# Patient Record
Sex: FEMALE | Race: WHITE | NOT HISPANIC OR LATINO | Employment: FULL TIME | URBAN - METROPOLITAN AREA
[De-identification: names, ages, dates, MRNs, and addresses within clinical notes are randomized per-mention and may not be internally consistent; named-entity substitution may affect disease eponyms.]

---

## 2024-01-16 ENCOUNTER — ROUTINE PRENATAL (OUTPATIENT)
Dept: OBSTETRICS AND GYNECOLOGY | Facility: CLINIC | Age: 29
End: 2024-01-16
Payer: COMMERCIAL

## 2024-01-16 VITALS — DIASTOLIC BLOOD PRESSURE: 80 MMHG | WEIGHT: 200 LBS | BODY MASS INDEX: 35.43 KG/M2 | SYSTOLIC BLOOD PRESSURE: 122 MMHG

## 2024-01-16 DIAGNOSIS — Z34.93 PRENATAL CARE IN THIRD TRIMESTER: Primary | ICD-10-CM

## 2024-01-16 LAB
GLUCOSE UR STRIP-MCNC: NEGATIVE MG/DL
PROT UR STRIP-MCNC: ABNORMAL MG/DL

## 2024-01-16 RX ORDER — FERROUS SULFATE 325(65) MG
325 TABLET ORAL
COMMUNITY

## 2024-01-16 NOTE — PROGRESS NOTES
OB FOLLOW UP  CC- Here for care of pregnancy        Malena Stewart is a 29 y.o.  31w4d patient being seen today for her obstetrical follow up visit. Patient reports headache, pain in her left arm that will sometimes radiate to her shoulder, irregular contractions, heartburn and occasional nausea.      Patient is wanting a refill of protonix.     She reports she only takes her Labetalol some days because it will drop her BP too low. The highest it has been is 128/91. Lowest it has been is 105/78 and was symptomatic of dizziness.     Her prenatal care is complicated by (and status) :    Patient Active Problem List   Diagnosis    Ovarian ectopic pregnancy    Esophageal dysphagia    Encounter to determine fetal viability of pregnancy, fetus 1    Chronic hypertension during pregnancy, antepartum    Currently pregnant     (normal spontaneous vaginal delivery)       Flu Status: Declines  TDAP status: received at last visit  Rhogam status: was not indicated  28 week labs: Reviewed and Labs show anemia. She is taking additional iron supplement.  Ultrasound Today: No  Non Stress Test: No.      ROS -   Patient Reports :  see above  Patient Denies: Loss of Fluid, Vaginal Spotting, Vomiting , and Epigastric pain  Fetal Movement : normal  All other systems reviewed and are negative.       The additional following portions of the patient's history were reviewed and updated as appropriate: allergies and current medications.    I have reviewed and agree with the HPI, ROS, and historical information as entered above. Donte Brunner MD      /80   Wt 90.7 kg (200 lb)   LMP 2023   BMI 35.43 kg/m²         EXAM:     Prenatal Vitals  BP: 122/80  Weight: 90.7 kg (200 lb)                   Urine Glucose Read-only: Negative  Urine Protein Read-only: (!) Trace           Assessment and Plan    Problem List Items Addressed This Visit    None  Visit Diagnoses       Prenatal care in third trimester    -  Primary     Relevant Orders    POC Urinalysis Dipstick (Completed)            Pregnancy at 31w4d  Fetal status reassuring.  28 week labs reviewed.    Activity and Exercise discussed.  Fetal movement/PTL or Labor precautions  Patient is on Prenatal vitamins  Has stye and she will use compresses  Return in about 1 week (around 1/23/2024).    Donte Brunner MD  01/16/2024

## 2024-01-23 ENCOUNTER — ROUTINE PRENATAL (OUTPATIENT)
Dept: OBSTETRICS AND GYNECOLOGY | Facility: CLINIC | Age: 29
End: 2024-01-23
Payer: COMMERCIAL

## 2024-01-23 VITALS — DIASTOLIC BLOOD PRESSURE: 86 MMHG | BODY MASS INDEX: 35.34 KG/M2 | SYSTOLIC BLOOD PRESSURE: 124 MMHG | WEIGHT: 199.5 LBS

## 2024-01-23 DIAGNOSIS — O10.919 CHRONIC HYPERTENSION DURING PREGNANCY, ANTEPARTUM: ICD-10-CM

## 2024-01-23 DIAGNOSIS — Z34.90 PRENATAL CARE, ANTEPARTUM: Primary | ICD-10-CM

## 2024-01-23 LAB
GLUCOSE UR STRIP-MCNC: NEGATIVE MG/DL
PROT UR STRIP-MCNC: NEGATIVE MG/DL

## 2024-01-23 PROCEDURE — 99213 OFFICE O/P EST LOW 20 MIN: CPT | Performed by: NURSE PRACTITIONER

## 2024-01-23 NOTE — PROGRESS NOTES
OB FOLLOW UP  CC- Here for care of pregnancy        Malena Stewart is a 29 y.o.  32w4d patient being seen today for her obstetrical follow up visit. Patient reports fatigue, pelvic pressure/pain, lower back pain without dysuria, non-pitting edema in her ankles and holly andrade.     She reports she only takes labetalol once a day 50mg dose The highest it has been is 128/91. Lowest it has been is 105/78 and was symptomatic of dizziness and headaches.     Her prenatal care is complicated by (and status) :  Chronic HTN. Her average BP has been 110's/70's.  Patient Active Problem List   Diagnosis    Ovarian ectopic pregnancy    Esophageal dysphagia    Encounter to determine fetal viability of pregnancy, fetus 1    Chronic hypertension during pregnancy, antepartum    Currently pregnant     (normal spontaneous vaginal delivery)       Flu Status: Declines  TDAP status: received at last visit  Rhogam status: was not indicated  28 week labs: Reviewed and Labs show anemia. She is taking additional iron supplement.  Ultrasound Today: No  Non Stress Test: No.      ROS -   Patient Denies: Loss of Fluid, Vaginal Spotting, Vision Changes, Headaches, Nausea , Vomiting , and Epigastric pain  Fetal Movement : normal  All other systems reviewed and are negative.       The additional following portions of the patient's history were reviewed and updated as appropriate: allergies, current medications, past family history, past medical history, past social history, past surgical history, and problem list.    I have reviewed and agree with the HPI, ROS, and historical information as entered above. Latasha Minor, APRN      /86   Wt 90.5 kg (199 lb 8 oz)   LMP 2023   BMI 35.34 kg/m²         EXAM:     Prenatal Vitals  BP: 124/86  Weight: 90.5 kg (199 lb 8 oz)   Fetal Heart Rate: +      Fundal Height (cm): 34 cm        Urine Glucose Read-only: Negative  Urine Protein Read-only: Negative           Assessment  and Plan    Problem List Items Addressed This Visit          Gravid and     Chronic hypertension during pregnancy, antepartum    Relevant Medications    labetalol (NORMODYNE) 100 MG tablet    Other Relevant Orders    [facility] [place]     Other Visit Diagnoses       Prenatal care, antepartum    -  Primary    Relevant Orders    POC Urinalysis Dipstick (Completed)    [facility]            Pregnancy at 32w4d  Fetal status reassuring.  28 week labs reviewed.    Activity and Exercise discussed.  Fetal movement/PTL or Labor precautions  Patient is on Prenatal vitamins  Reviewed Pre-eclampsia signs/symptoms  Return in about 3 days (around 2024) for after PDC US.    Latasha Minor, APRN  2024

## 2024-01-26 ENCOUNTER — ROUTINE PRENATAL (OUTPATIENT)
Dept: OBSTETRICS AND GYNECOLOGY | Facility: CLINIC | Age: 29
End: 2024-01-26
Payer: COMMERCIAL

## 2024-01-26 VITALS — DIASTOLIC BLOOD PRESSURE: 68 MMHG | BODY MASS INDEX: 35.43 KG/M2 | SYSTOLIC BLOOD PRESSURE: 110 MMHG | WEIGHT: 200 LBS

## 2024-01-26 DIAGNOSIS — Z34.83 MULTIGRAVIDA IN THIRD TRIMESTER: Primary | ICD-10-CM

## 2024-01-26 DIAGNOSIS — Z3A.33 33 WEEKS GESTATION OF PREGNANCY: ICD-10-CM

## 2024-01-26 DIAGNOSIS — O10.919 CHRONIC HYPERTENSION DURING PREGNANCY, ANTEPARTUM: ICD-10-CM

## 2024-01-26 PROBLEM — Z34.90 PREGNANCY: Status: ACTIVE | Noted: 2024-01-26

## 2024-01-26 LAB
GLUCOSE UR STRIP-MCNC: NEGATIVE MG/DL
PROT UR STRIP-MCNC: ABNORMAL MG/DL

## 2024-01-26 NOTE — PROGRESS NOTES
OB FOLLOW UP  CC- Here for care of pregnancy        Malena Stewart is a 29 y.o.  33w0d patient being seen today for her obstetrical follow up visit. Patient reports having contractions on occasion, pelvic pressure, and HA(s) that are not severe and improve some with Tylenol. She reports history of migraines and HA(s) prior to pregnancy. Reports HA(s) seem to be caused by low BP. Denies vision changes, upper abd pain, constipation, SOA unrelated to preg, and dysuria. Admits to not drinking enough water recently.     Her prenatal care is complicated by (and status) :    Patient Active Problem List   Diagnosis    Ovarian ectopic pregnancy    Esophageal dysphagia    Encounter to determine fetal viability of pregnancy, fetus 1    Chronic hypertension during pregnancy, antepartum    Currently pregnant     (normal spontaneous vaginal delivery)    Pregnancy       Flu Status: Declines  TDAP status: received at last visit  Rhogam status: was not indicated  28 week labs: Reviewed  Ultrasound Today: No  Non Stress Test: Yes minutes > 20  non-stress test: NST: Reactive  indication:  CHTN on Labetalol      ROS -   Patient Denies: Loss of Fluid, Vaginal Spotting, Vision Changes, Headaches, Nausea , Vomiting , Epigastric pain, and skin itching  Fetal Movement : normal  All other systems reviewed and are negative.       The additional following portions of the patient's history were reviewed and updated as appropriate: allergies, current medications, past family history, past medical history, past social history, past surgical history, and problem list.    I have reviewed and agree with the HPI, ROS, and historical information as entered above. Ivette Rojo, APRN      /68   Wt 90.7 kg (200 lb)   LMP 2023   BMI 35.43 kg/m²         EXAM:     Prenatal Vitals  BP: 110/68  Weight: 90.7 kg (200 lb)   Fetal Heart Rate: NST   Dilation/Effacement/Station  Dilation:  (Cervix check offered. Declined.)        Urine Glucose Read-only: Negative  Urine Protein Read-only: (!) Trace       Assessment and Plan    Problem List Items Addressed This Visit       Chronic hypertension during pregnancy, antepartum    Overview     On Labetalol 50mg BID         Relevant Medications    labetalol (NORMODYNE) 100 MG tablet    Pregnancy    Overview     Tdap 12/28/23          Other Visit Diagnoses       Multigravida in third trimester    -  Primary    Relevant Orders    POC Urinalysis Dipstick (Completed)            Pregnancy at 33w0d  Fetal status reassuring.  28 week labs reviewed.    Activity and Exercise discussed.  Fetal movement/PTL or Labor precautions  Patient is on Prenatal vitamins  Discussed/encouraged Flu vaccination  Discussed/encouraged social distancing/COVID19 precautions; encouraged vaccination when able  Reviewed Pre-eclampsia signs/symptoms  Increase H20 intake to 80oz/day  Belly band and Tylenol sparingly PRN  Offered CCUA and cervix check. Declined.  PDC 2/5/24  Return in about 4 days (around 1/30/2024) for RACHEL worrell/Kannan NST.    Ivette Rojo, APRN  01/26/2024

## 2024-01-30 ENCOUNTER — HOSPITAL ENCOUNTER (OUTPATIENT)
Facility: HOSPITAL | Age: 29
Discharge: HOME | End: 2024-01-30
Attending: OBSTETRICS & GYNECOLOGY | Admitting: OBSTETRICS & GYNECOLOGY
Payer: COMMERCIAL

## 2024-01-30 ENCOUNTER — ROUTINE PRENATAL (OUTPATIENT)
Dept: OBSTETRICS AND GYNECOLOGY | Facility: CLINIC | Age: 29
End: 2024-01-30
Payer: COMMERCIAL

## 2024-01-30 VITALS
OXYGEN SATURATION: 98 % | DIASTOLIC BLOOD PRESSURE: 72 MMHG | HEART RATE: 85 BPM | WEIGHT: 198 LBS | RESPIRATION RATE: 16 BRPM | HEIGHT: 63 IN | BODY MASS INDEX: 35.08 KG/M2 | SYSTOLIC BLOOD PRESSURE: 132 MMHG | TEMPERATURE: 98.2 F

## 2024-01-30 VITALS — WEIGHT: 198 LBS | DIASTOLIC BLOOD PRESSURE: 94 MMHG | BODY MASS INDEX: 35.07 KG/M2 | SYSTOLIC BLOOD PRESSURE: 138 MMHG

## 2024-01-30 DIAGNOSIS — Z34.90 PRENATAL CARE, ANTEPARTUM: Primary | ICD-10-CM

## 2024-01-30 DIAGNOSIS — O10.919 CHRONIC HYPERTENSION DURING PREGNANCY, ANTEPARTUM: ICD-10-CM

## 2024-01-30 PROBLEM — O47.00 PRETERM CONTRACTIONS: Status: ACTIVE | Noted: 2024-01-30

## 2024-01-30 LAB
GLUCOSE UR STRIP-MCNC: NEGATIVE MG/DL
PROT UR STRIP-MCNC: NEGATIVE MG/DL

## 2024-01-30 PROCEDURE — 96372 THER/PROPH/DIAG INJ SC/IM: CPT

## 2024-01-30 PROCEDURE — 25010000002 TERBUTALINE PER 1 MG: Performed by: OBSTETRICS & GYNECOLOGY

## 2024-01-30 PROCEDURE — G0378 HOSPITAL OBSERVATION PER HR: HCPCS

## 2024-01-30 PROCEDURE — 59025 FETAL NON-STRESS TEST: CPT

## 2024-01-30 PROCEDURE — G0463 HOSPITAL OUTPT CLINIC VISIT: HCPCS

## 2024-01-30 PROCEDURE — 25810000003 LACTATED RINGERS SOLUTION: Performed by: OBSTETRICS & GYNECOLOGY

## 2024-01-30 RX ORDER — LIDOCAINE HYDROCHLORIDE 10 MG/ML
0.5 INJECTION, SOLUTION EPIDURAL; INFILTRATION; INTRACAUDAL; PERINEURAL ONCE AS NEEDED
Status: DISCONTINUED | OUTPATIENT
Start: 2024-01-30 | End: 2024-01-30 | Stop reason: HOSPADM

## 2024-01-30 RX ORDER — TERBUTALINE SULFATE 1 MG/ML
0.25 INJECTION, SOLUTION SUBCUTANEOUS ONCE AS NEEDED
Status: DISCONTINUED | OUTPATIENT
Start: 2024-01-30 | End: 2024-01-30 | Stop reason: HOSPADM

## 2024-01-30 RX ORDER — SODIUM CHLORIDE 9 MG/ML
40 INJECTION, SOLUTION INTRAVENOUS AS NEEDED
Status: DISCONTINUED | OUTPATIENT
Start: 2024-01-30 | End: 2024-01-30 | Stop reason: HOSPADM

## 2024-01-30 RX ORDER — SODIUM CHLORIDE 0.9 % (FLUSH) 0.9 %
10 SYRINGE (ML) INJECTION EVERY 12 HOURS SCHEDULED
Status: DISCONTINUED | OUTPATIENT
Start: 2024-01-30 | End: 2024-01-30 | Stop reason: HOSPADM

## 2024-01-30 RX ORDER — SODIUM CHLORIDE 0.9 % (FLUSH) 0.9 %
10 SYRINGE (ML) INJECTION AS NEEDED
Status: DISCONTINUED | OUTPATIENT
Start: 2024-01-30 | End: 2024-01-30 | Stop reason: HOSPADM

## 2024-01-30 RX ORDER — PANTOPRAZOLE SODIUM 20 MG/1
20 TABLET, DELAYED RELEASE ORAL DAILY
Qty: 30 TABLET | Refills: 2 | Status: SHIPPED | OUTPATIENT
Start: 2024-01-30

## 2024-01-30 RX ORDER — TERBUTALINE SULFATE 1 MG/ML
0.25 INJECTION, SOLUTION SUBCUTANEOUS ONCE
Status: COMPLETED | OUTPATIENT
Start: 2024-01-30 | End: 2024-01-30

## 2024-01-30 RX ADMIN — SODIUM CHLORIDE, POTASSIUM CHLORIDE, SODIUM LACTATE AND CALCIUM CHLORIDE 1000 ML: 600; 310; 30; 20 INJECTION, SOLUTION INTRAVENOUS at 13:38

## 2024-01-30 RX ADMIN — TERBUTALINE SULFATE 0.25 MG: 1 INJECTION SUBCUTANEOUS at 13:41

## 2024-01-30 NOTE — H&P
[facility]  Obstetric History and Physical    Chief Complaint   Patient presents with    Contractions       Subjective     Patient is a 29 y.o. female  currently at 33w4d, who presents with pressure and nausea.  Was noted to have contractions on NST and cervical exam 1 to 2 cm.    Her prenatal care is complicated by  hypertension  chronic hypertension.  Her previous obstetric/gynecological history is noted for is remarkable for term  x 3.    The following portions of the patients history were reviewed and updated as appropriate: current medications, allergies, past medical history, past surgical history, past family history, past social history, and problem list .       Prenatal Information:  Prenatal Results       Initial Prenatal Labs       Test Value Reference Range Date Time    Hemoglobin  11.8 g/dL 12.0 - 15.9 10/09/23 1549       11.9 g/dL 11.1 - 15.9 23 1508       14.3 g/dL 12.0 - 15.9 23 0702    Hematocrit  36.0 % 34.0 - 46.6 10/09/23 1549       36.2 % 34.0 - 46.6 23 1508       42.3 % 34.0 - 46.6 23 0702    Platelets  221 10*3/mm3 140 - 450 10/09/23 1549       282 x10E3/uL 150 - 450 23 1508       301 10*3/mm3 140 - 450 23 0702    Rubella IgG  2.08 index Immune >0.99 23 1508    Hepatitis B SAg  Negative  Negative 23 1508    Hepatitis C Ab  Non Reactive  Non Reactive 23 1508    RPR  Non Reactive  Non Reactive 23 1508    T. Pallidum Ab         ABO  A   23 1508    Rh  Positive   23 1508    Antibody Screen  Negative  Negative 23 1508    HIV  Non Reactive  Non Reactive 23 1508    Urine Culture  Final report   24 1045       Final report   24        Final report   23 1508    Gonorrhea  Negative  Negative 23 1508    Chlamydia  Negative  Negative 23 1508    TSH  1.840 uIU/mL 0.270 - 4.200 21 1447    HgB A1c         Varicella IgG        HgB Electrophoresis         Cystic fibrosis                    Fetal testing        Test Value Reference Range Date Time    NIPT        MSAFP        AFP-4                  2nd and 3rd Trimester       Test Value Reference Range Date Time    Hemoglobin (repeated)  10.9 g/dL 12.0 - 15.9 01/03/24 0949       10.9 g/dL 12.0 - 15.9 01/03/24 0949       11.0 g/dL 12.0 - 15.9 12/28/23 1241       11.9 g/dL 11.1 - 15.9 11/28/23 1529    Hematocrit (repeated)  32.9 % 34.0 - 46.6 01/03/24 0949       32.9 % 34.0 - 46.6 01/03/24 0949       34.4 % 34.0 - 46.6 12/28/23 1241       35.0 % 34.0 - 46.6 11/28/23 1529    Platelets   191 10*3/mm3 140 - 450 01/03/24 0949       191 10E3/mm3 140 - 450 01/03/24 0949       208 10*3/mm3 140 - 450 12/28/23 1241       227 x10E3/uL 150 - 450 11/28/23 1529       221 10*3/mm3 140 - 450 10/09/23 1549       282 x10E3/uL 150 - 450 07/28/23 1508       301 10*3/mm3 140 - 450 06/16/23 0702    GCT  108 mg/dL 65 - 139 12/28/23 1241    Antibody Screen (repeated)  Negative  Negative 12/28/23 1241    Third Trimester syphilis screen (repeated)         GTT Fasting        GTT 1 Hr        GTT 2 Hr        GTT 3 Hr        Group B Strep                  Other testing        Test Value Reference Range Date Time    Parvo IgG         CMV IgG                   Drug Screening       Test Value Reference Range Date Time    Amphetamine Screen  Negative ng/mL Dbrhjk=6875 07/28/23 1508    Barbiturate Screen  Negative ng/mL Pmtepp=382 07/28/23 1508    Benzodiazepine Screen  Negative ng/mL Zteqrj=821 07/28/23 1508    Methadone Screen  Negative ng/mL Wublcm=716 07/28/23 1508    Phencyclidine Screen  Negative ng/mL Cutoff=25 07/28/23 1508    Opiates Screen  Negative ng/mL Fyrquo=987 07/28/23 1508    THC Screen  Negative ng/mL Cutoff=20 07/28/23 1508    Cocaine Screen  Negative ng/mL Ubdlqi=335 07/28/23 1508    Propoxyphene Screen  Negative ng/mL Fbkuzb=251 07/28/23 1508    Buprenorphine Screen        Methamphetamine Screen        Oxycodone Screen        Tricyclic Antidepressants Screen                   Legend    ^: Historical                          External Prenatal Results       Pregnancy Outside Results - Transcribed From Office Records - See Scanned Records For Details       Test Value Date Time    ABO  A  07/28/23 1508    Rh  Positive  07/28/23 1508    Antibody Screen  Negative  12/28/23 1241       Negative  07/28/23 1508    Varicella IgG       Rubella  2.08 index 07/28/23 1508    Hgb  10.9 g/dL 01/03/24 0949       10.9 g/dL 01/03/24 0949       11.0 g/dL 12/28/23 1241       11.9 g/dL 11/28/23 1529       11.8 g/dL 10/09/23 1549       11.9 g/dL 07/28/23 1508       14.3 g/dL 06/16/23 0702    Hct  32.9 % 01/03/24 0949       32.9 % 01/03/24 0949       34.4 % 12/28/23 1241       35.0 % 11/28/23 1529       36.0 % 10/09/23 1549       36.2 % 07/28/23 1508       42.3 % 06/16/23 0702    Glucose Fasting GTT       Glucose Tolerance Test 1 hour       Glucose Tolerance Test 3 hour       Gonorrhea (discrete)  Negative  07/28/23 1508    Chlamydia (discrete)  Negative  07/28/23 1508    RPR  Non Reactive  07/28/23 1508    VDRL       Syphilis Antibody       HBsAg  Negative  07/28/23 1508    Herpes Simplex Virus PCR       Herpes Simplex VIrus Culture       HIV  Non Reactive  07/28/23 1508    Hep C RNA Quant PCR       Hep C Antibody  Non Reactive  07/28/23 1508    AFP       Group B Strep  Streptococcus agalactiae (Group B)  08/30/22 1739    GBS Susceptibility to Clindamycin       GBS Susceptibility to Erythromycin       Fetal Fibronectin       Genetic Testing, Maternal Blood                 Drug Screening       Test Value Date Time    Urine Drug Screen       Amphetamine Screen  Negative ng/mL 07/28/23 1508    Barbiturate Screen  Negative ng/mL 07/28/23 1508    Benzodiazepine Screen  Negative ng/mL 07/28/23 1508    Methadone Screen  Negative ng/mL 07/28/23 1508    Phencyclidine Screen  Negative ng/mL 07/28/23 1508    Opiates Screen       THC Screen       Cocaine Screen       Propoxyphene Screen  Negative ng/mL  23 1508    Buprenorphine Screen       Methamphetamine Screen       Oxycodone Screen       Tricyclic Antidepressants Screen                 Legend    ^: Historical                             Past OB History:     OB History    Para Term  AB Living   6 3 3 0 2 3   SAB IAB Ectopic Molar Multiple Live Births   1 0 1 0 0 3      # Outcome Date GA Lbr Adalberto/2nd Weight Sex Delivery Anes PTL Lv   6 Current            5 Term 22 38w0d / 00:21 3210 g (7 lb 1.2 oz) F Vag-Spont EPI N CHUCK      Name: CHANDLER SPARKS      Apgar1: 7  Apgar5: 9   4 SAB 2021           3 Ectopic 2021              Birth Comments: methotrexate   2 Term 13 38w0d  3175 g (7 lb) M Vag-Spont  N CHUCK   1 Term 11 38w0d  2920 g (6 lb 7 oz) F Vag-Spont  N CHUCK      Obstetric Comments   FOB#1-G1,2   FOB#2-G3,4,5       Past Medical History: Past Medical History:   Diagnosis Date    Acid reflux     Anxiety     Depression     Ectopic pregnancy 2021    Endometrial polyp     removed 3/2021    Gum disease         Herpes     Oral    Hypertension     Migraine     Recurrent pregnancy loss, antepartum condition or complication 2021    Varicella       Past Surgical History Past Surgical History:   Procedure Laterality Date    D & C HYSTEROSCOPY  2021    with polypectomy    DILATATION AND CURETTAGE  2021    ENDOSCOPY  2021    HYSTEROSCOPY  2021    TOOTH EXTRACTION  2023    WISDOM TOOTH EXTRACTION        Family History: Family History   Problem Relation Age of Onset    Hypertension Father     Diabetes Other     Hypertension Other     Breast cancer Neg Hx     Ovarian cancer Neg Hx     Uterine cancer Neg Hx     Colon cancer Neg Hx       Social History:  reports that she has never smoked. She has never been exposed to tobacco smoke. She has never used smokeless tobacco.   reports that she does not currently use alcohol.   reports no history of drug use.        Review of Systems:    All  other systems reviewed and negative    Objective     Vital Signs Range for the last 24 hours  Temperature: Temp:  [98.2 °F (36.8 °C)] 98.2 °F (36.8 °C)   Temp Source: Temp src: Oral   BP: BP: (124-138)/(72-94) 132/72   Pulse: Heart Rate:  [85] 85   Respirations: Resp:  [16] 16   SPO2: SpO2:  [98 %] 98 %   O2 Amount (l/min):     O2 Devices     Weight: Weight:  [89.8 kg (198 lb)] 89.8 kg (198 lb)     Physical Examination: General appearance - alert, well appearing, and in no distress  Neck - supple, no significant adenopathy  Abdomen - soft, nontender, nondistended, no masses or organomegaly  Pelvic - normal external genitalia, vulva, vagina, cervix, uterus and adnexa  Musculoskeletal - no joint tenderness, deformity or swelling  Extremities - peripheral pulses normal, no pedal edema, no clubbing or cyanosis  Skin - normal coloration and turgor, no rashes, no suspicious skin lesions noted    Presentation: vtx   Cervix: Exam by:  MD, unchanged   Dilation:  1+   Effacement:  50   Station:  -3     Fetal Heart Rate Assessment   NST reactive  Time >20min  Indication  contractions    Uterine Assessment   Occ ctx        Assessment & Plan        contractions    Chronic hypertension during pregnancy, antepartum      Assessment & Plan    Assessment:  1.  Intrauterine pregnancy at 33w4d weeks gestation with reassuring fetal status.    2.   contractions resolved with terbutaline and 1 L LR bolus, no cervical change    Plan:  1. D/c home.    2. Plan of care has been reviewed with patient  3.  Risks, benefits of treatment plan have been discussed.  4.  All questions have been answered.  5.  F/u as sched on Friday for NST  6.  PTL precautions      Pat Marmolejo MD  2024  14:43 EST

## 2024-01-30 NOTE — PROGRESS NOTES
OB FOLLOW UP  CC- Here for care of pregnancy        Malena Stewart is a 29 y.o.  33w4d patient being seen today for her obstetrical follow up visit. Patient reports headache  relieved by Tylenol or rest, visual changes , pelvic pressure not related to activity and nausea without vomiting and diarrhea nightly x 1 week    Her prenatal care is complicated by (and status) :   Patient Active Problem List   Diagnosis    Ovarian ectopic pregnancy    Esophageal dysphagia    Encounter to determine fetal viability of pregnancy, fetus 1    Chronic hypertension during pregnancy, antepartum    Currently pregnant     (normal spontaneous vaginal delivery)    Pregnancy       Flu Status: Declines  Ultrasound Today: No  Non Stress Test: Yes minutes 20+ minutes, reactive; indication: CHTN    ROS -   Patient Denies: Loss of Fluid, Vaginal Spotting, Vomiting , Contractions, Epigastric pain, and skin itching  Fetal Movement : normal  All other systems reviewed and are negative.       The additional following portions of the patient's history were reviewed and updated as appropriate: allergies, current medications, past medical history, past social history, past surgical history, and problem list.    I have reviewed and agree with the HPI, ROS, and historical information as entered above. Latasha Kennedyf, APRN      /94   Wt 89.8 kg (198 lb)   LMP 2023   BMI 35.07 kg/m²       EXAM:     Prenatal Vitals  BP: 138/94  Weight: 89.8 kg (198 lb)   Fetal Heart Rate: NST               Urine Glucose Read-only: Negative  Urine Protein Read-only: Negative           Assessment and Plan    Problem List Items Addressed This Visit          Gravid and     Chronic hypertension during pregnancy, antepartum    Overview     On Labetalol 50mg BID         Relevant Medications    labetalol (NORMODYNE) 100 MG tablet     Other Visit Diagnoses       Prenatal care, antepartum    -  Primary    Relevant Orders    POC  Urinalysis Dipstick (Completed)            Pregnancy at 33w4d  Fetal status reassuring.   Activity and Exercise discussed.  Fetal movement/PTL or Labor precautions  Patient is on Prenatal vitamins  Reviewed Pre-eclampsia signs/symptoms  Return in about 3 days (around 2/2/2024) for NST.    Latasha Minor, APRN  01/30/2024

## 2024-01-31 ENCOUNTER — TELEPHONE (OUTPATIENT)
Dept: OBSTETRICS AND GYNECOLOGY | Facility: CLINIC | Age: 29
End: 2024-01-31
Payer: COMMERCIAL

## 2024-02-01 ENCOUNTER — HOSPITAL ENCOUNTER (OUTPATIENT)
Facility: HOSPITAL | Age: 29
Discharge: HOME | End: 2024-02-01
Attending: OBSTETRICS & GYNECOLOGY | Admitting: OBSTETRICS & GYNECOLOGY
Payer: COMMERCIAL

## 2024-02-01 VITALS
SYSTOLIC BLOOD PRESSURE: 135 MMHG | RESPIRATION RATE: 16 BRPM | WEIGHT: 197.12 LBS | DIASTOLIC BLOOD PRESSURE: 75 MMHG | BODY MASS INDEX: 34.92 KG/M2 | TEMPERATURE: 98.2 F

## 2024-02-01 PROBLEM — Z34.93 THIRD TRIMESTER PREGNANCY: Status: ACTIVE | Noted: 2024-02-01

## 2024-02-01 PROCEDURE — 59025 FETAL NON-STRESS TEST: CPT

## 2024-02-01 PROCEDURE — G0463 HOSPITAL OUTPT CLINIC VISIT: HCPCS

## 2024-02-01 PROCEDURE — G0378 HOSPITAL OBSERVATION PER HR: HCPCS

## 2024-02-01 PROCEDURE — 99221 1ST HOSP IP/OBS SF/LOW 40: CPT | Performed by: OBSTETRICS & GYNECOLOGY

## 2024-02-01 PROCEDURE — 59025 FETAL NON-STRESS TEST: CPT | Performed by: OBSTETRICS & GYNECOLOGY

## 2024-02-01 NOTE — H&P
"[facility]  Obstetric History and Physical    Referring Provider: TOIBAS Gallardo      Chief Complaint   Patient presents with    Contractions       Subjective     Patient is a 29 y.o. female  currently at 33w6d, who presents with complaint of contractions.  Patient reports mild irregular contractions throughout the day without leaking fluid or vaginal bleeding.  Patient reports normal fetal activity.  Patient triaged in labor and delivery on  for  contractions received IV hydration and subcu terbutaline.  Prior exam -3.  After evaluation cervical exam unchanged.        The following portions of the patients history were reviewed and updated as appropriate: current medications, allergies, past medical history, past surgical history, past family history, past social history, and problem list .       Prenatal Information:   Maternal Prenatal Labs  Blood Type No results found for: \"ABO\"   Rh Status No results found for: \"RH\"   Antibody Screen No results found for: \"ABSCRN\"   Gonnorhea No results found for: \"GCCX\"   Chlamydia No results found for: \"CLAMYDCU\"   RPR No results found for: \"RPR\"   Syphilis Antibody No results found for: \"SYPHILIS\"   Rubella No results found for: \"RUBELLAIGGIN\"   Hepatitis B Surface Antigen No results found for: \"HEPBSAG\"   HIV-1 Antibody No results found for: \"LABHIV1\"   Hepatitis C Antibody No results found for: \"HEPCAB\"   Rapid Urin Drug Screen No results found for: \"AMPMETHU\", \"BARBITSCNUR\", \"LABBENZSCN\", \"LABMETHSCN\", \"LABOPIASCN\", \"THCURSCR\", \"COCAINEUR\", \"AMPHETSCREEN\", \"PROPOXSCN\", \"BUPRENORSCNU\", \"METAMPSCNUR\", \"OXYCODONESCN\", \"TRICYCLICSCN\"   Group B Strep Culture No results found for: \"GBSANTIGEN\"           External Prenatal Results       Pregnancy Outside Results - Transcribed From Office Records - See Scanned Records For Details       Test Value Date Time    ABO  A  23 1508    Rh  Positive  23 1508    Antibody Screen  Negative  " 23 1241       Negative  23 1508    Varicella IgG       Rubella  2.08 index 23 1508    Hgb  10.9 g/dL 24 0949       10.9 g/dL 24 0949       11.0 g/dL 23 1241       11.9 g/dL 23 1529       11.8 g/dL 10/09/23 1549       11.9 g/dL 23 1508       14.3 g/dL 23 0702    Hct  32.9 % 24 0949       32.9 % 24 0949       34.4 % 23 1241       35.0 % 23 1529       36.0 % 10/09/23 1549       36.2 % 23 1508       42.3 % 23 0702    Glucose Fasting GTT       Glucose Tolerance Test 1 hour       Glucose Tolerance Test 3 hour       Gonorrhea (discrete)  Negative  23 1508    Chlamydia (discrete)  Negative  23 1508    RPR  Non Reactive  23 1508    VDRL       Syphilis Antibody       HBsAg  Negative  23 1508    Herpes Simplex Virus PCR       Herpes Simplex VIrus Culture       HIV  Non Reactive  23 1508    Hep C RNA Quant PCR       Hep C Antibody  Non Reactive  23 1508    AFP       Group B Strep  Streptococcus agalactiae (Group B)  22 1739    GBS Susceptibility to Clindamycin       GBS Susceptibility to Erythromycin       Fetal Fibronectin       Genetic Testing, Maternal Blood                 Drug Screening       Test Value Date Time    Urine Drug Screen       Amphetamine Screen  Negative ng/mL 23 1508    Barbiturate Screen  Negative ng/mL 23 1508    Benzodiazepine Screen  Negative ng/mL 23 1508    Methadone Screen  Negative ng/mL 23 1508    Phencyclidine Screen  Negative ng/mL 23 1508    Opiates Screen       THC Screen       Cocaine Screen       Propoxyphene Screen  Negative ng/mL 23 1508    Buprenorphine Screen       Methamphetamine Screen       Oxycodone Screen       Tricyclic Antidepressants Screen                 Legend    ^: Historical                              Past OB History:       OB History    Para Term  AB Living   6 3 3 0 2 3   SAB IAB Ectopic Molar  Multiple Live Births   1 0 1 0 0 3      # Outcome Date GA Lbr Adalberto/2nd Weight Sex Delivery Anes PTL Lv   6 Current            5 Term 09/08/22 38w0d / 00:21 3210 g (7 lb 1.2 oz) F Vag-Spont EPI N CHUCK      Name: CHANDLER SPARKS      Apgar1: 7  Apgar5: 9   4 SAB 11/2021           3 Ectopic 07/2021              Birth Comments: methotrexate   2 Term 04/20/13 38w0d  3175 g (7 lb) M Vag-Spont  N CHUCK   1 Term 11/01/11 38w0d  2920 g (6 lb 7 oz) F Vag-Spont  N CHUCK      Obstetric Comments   FOB#1-G1,2   FOB#2-G3,4,5       Past Medical History: Past Medical History:   Diagnosis Date    Acid reflux     Anxiety     Depression     Ectopic pregnancy July 2021    Endometrial polyp     removed 3/2021    Gum disease     2023    Herpes 2018    Oral    Hypertension     Migraine     Recurrent pregnancy loss, antepartum condition or complication November 7, 2021    Varicella       Past Surgical History Past Surgical History:   Procedure Laterality Date    D & C HYSTEROSCOPY  03/26/2021    with polypectomy    DILATATION AND CURETTAGE  03/26/2021    ENDOSCOPY  05/2021    HYSTEROSCOPY  03/26/2021    TOOTH EXTRACTION  07/28/2023    WISDOM TOOTH EXTRACTION        Family History: Family History   Problem Relation Age of Onset    Hypertension Father     Diabetes Other     Hypertension Other     Breast cancer Neg Hx     Ovarian cancer Neg Hx     Uterine cancer Neg Hx     Colon cancer Neg Hx       Social History:  reports that she has never smoked. She has never been exposed to tobacco smoke. She has never used smokeless tobacco.   reports that she does not currently use alcohol.   reports no history of drug use.                   General ROS Negative Findings:Headaches, Visual Changes, Epigastric pain, Anorexia, Nausia/Vomiting, ROM, and Vaginal Bleeding    ROS     All other systems have been reviewed and are neg  Objective       Vital Signs Range for the last 24 hours  Temperature: Temp:  [98.2 °F (36.8 °C)] 98.2 °F (36.8 °C)   Temp Source:  Temp src: Oral   BP: BP: (135)/(75) 135/75   Pulse:     Respirations: Resp:  [16] 16   SPO2:     O2 Amount (l/min):     O2 Devices     Weight: Weight:  [89.4 kg (197 lb 1.9 oz)] 89.4 kg (197 lb 1.9 oz)     Physical Examination:   General:   alert, appears stated age, and cooperative   Skin:   normal   HEENT: Sclera clear   Lungs:      Heart:      Gastrointestinal: Abdomen soft, gravid uterus, nontender, no guarding, rebound benign exam   Lower Extremities: Trace edema, no calf tenderness   : External genitalia: normal general appearance  Uterus: enlarged   Musculoskeletal:     Neuro: No focal deficits noted.         Presentation: vtx   Cervix: Exam by: Method: sterile exam per physician   Dilation:  1+   Effacement: Cervical Effacement: 50-60%   Station:  -3    No Blood noted on glove.       Fetal Heart Rate Assessment   Method:     Beats/min:     Baseline:     Varibility:     Accels:     Decels:     Tracing Category:     NST-indications contractions, interpretation reactive, moderate variability, accelerations present 15 x 15, no fetal deceleration noted, onset 1055, end time 1545, rare contraction noted.  Uterine Assessment   Method:     Frequency (min):     Ctx Count in 10 min:     Duration:     Intensity:     Intensity by IUPC:     Resting Tone:     Resting Tone by IUPC:     [place] Units:       Laboratory Results:   Lab Results (last 24 hours)       ** No results found for the last 24 hours. **          Radiology Review:   Imaging Results (Last 24 Hours)       ** No results found for the last 24 hours. **          Other Studies:    Assessment & Plan       Third trimester pregnancy        Assessment:  1.  Intrauterine pregnancy at 33w6d weeks gestation with reactive fetal status.    2.  False labor  3.  History of 3 full-term vaginal deliveries  4.      Plan:  1.  Discharge to home, kick count,  labor instructions given, follow-up with OB provider tomorrow for schedule appointment or as needed  2.  Plan of care has been reviewed with patient.  3.  Risks, benefits of treatment plan have been discussed.  4.  All questions have been answered.  5      Oren Solares,   2/1/2024  15:39 EST

## 2024-02-02 ENCOUNTER — ROUTINE PRENATAL (OUTPATIENT)
Dept: OBSTETRICS AND GYNECOLOGY | Facility: CLINIC | Age: 29
End: 2024-02-02
Payer: COMMERCIAL

## 2024-02-02 VITALS — DIASTOLIC BLOOD PRESSURE: 84 MMHG | WEIGHT: 200.2 LBS | BODY MASS INDEX: 35.46 KG/M2 | SYSTOLIC BLOOD PRESSURE: 134 MMHG

## 2024-02-02 DIAGNOSIS — Z34.90 PREGNANCY, UNSPECIFIED GESTATIONAL AGE: ICD-10-CM

## 2024-02-02 DIAGNOSIS — O10.919 CHRONIC HYPERTENSION DURING PREGNANCY, ANTEPARTUM: ICD-10-CM

## 2024-02-02 DIAGNOSIS — R13.19 ESOPHAGEAL DYSPHAGIA: ICD-10-CM

## 2024-02-02 DIAGNOSIS — O99.019 MATERNAL ANEMIA IN PREGNANCY, ANTEPARTUM: ICD-10-CM

## 2024-02-02 DIAGNOSIS — Z34.83 MULTIGRAVIDA IN THIRD TRIMESTER: Primary | ICD-10-CM

## 2024-02-02 LAB
GLUCOSE UR STRIP-MCNC: NEGATIVE MG/DL
PROT UR STRIP-MCNC: NEGATIVE MG/DL

## 2024-02-02 NOTE — PROGRESS NOTES
OB FOLLOW UP  CC- Here for care of pregnancy        Malena Stewart is a 29 y.o.  34w0d patient being seen today for her obstetrical follow up visit. Patient reports was seen in L&D last night for contractions.     Her prenatal care is complicated by (and status) :   Patient Active Problem List   Diagnosis    Ovarian ectopic pregnancy    Esophageal dysphagia    Encounter to determine fetal viability of pregnancy, fetus 1    Chronic hypertension during pregnancy, antepartum    Currently pregnant    Pregnancy     contractions    Third trimester pregnancy    Maternal anemia in pregnancy, antepartum       Flu Status: Declines  Ultrasound Today: No  Non Stress Test: Yes minutes 20   indication: Hypertension    ROS -   Patient Denies: leaking, nausea, vomiting, or bleeding  Fetal Movement : normal  All other systems reviewed and are negative.       The additional following portions of the patient's history were reviewed and updated as appropriate: allergies, current medications, past family history, past medical history, past social history, past surgical history, and problem list.    I have reviewed and agree with the HPI, ROS, and historical information as entered above. Lora Loza, APRN      /84   Wt 90.8 kg (200 lb 3.2 oz)   LMP 2023   BMI 35.46 kg/m²       EXAM:     Prenatal Vitals  BP: 134/84  Weight: 90.8 kg (200 lb 3.2 oz)   Fetal Heart Rate: NST               Urine Glucose Read-only: Negative  Urine Protein Read-only: Negative           Assessment and Plan    Problem List Items Addressed This Visit          Gastrointestinal Abdominal     Esophageal dysphagia    Overview     Protonix daily         Relevant Medications    pantoprazole (PROTONIX) 20 MG EC tablet       Gravid and     Chronic hypertension during pregnancy, antepartum    Overview     On Labetalol 50mg daily only r/t hypotension    Baseline PEP ALT 48  Baby aspirin 81mg daily  NST 2xweek  PDC 24          Relevant Medications    labetalol (NORMODYNE) 100 MG tablet    Other Relevant Orders    Fetal Nonstress Test    Pregnancy    Overview     Tdap 12/28/23            Hematology and Neoplasia    Maternal anemia in pregnancy, antepartum    Overview     Ferrous sulfate 325mg daily         Relevant Medications    ferrous sulfate 325 (65 FE) MG tablet     Other Visit Diagnoses       Multigravida in third trimester    -  Primary    Relevant Orders    POC Urinalysis Dipstick (Completed)            Pregnancy at 34w0d  Fetal status reassuring. 20 minute NST reactive 15 x 15  Activity and Exercise discussed.  Fetal movement/PTL or Labor precautions  Patient is on Prenatal vitamins  Reviewed Pre-eclampsia signs/symptoms-denies  Discussed bASA for PIH prevention from 12 to 36wk  Follow up after PDC ultrasound Monday     Lora Loza, APRN  02/02/2024

## 2024-02-05 ENCOUNTER — HOSPITAL ENCOUNTER (OUTPATIENT)
Dept: WOMENS IMAGING | Facility: HOSPITAL | Age: 29
Discharge: HOME | End: 2024-02-05
Admitting: NURSE PRACTITIONER
Payer: COMMERCIAL

## 2024-02-05 ENCOUNTER — ROUTINE PRENATAL (OUTPATIENT)
Dept: OBSTETRICS AND GYNECOLOGY | Facility: CLINIC | Age: 29
End: 2024-02-05
Payer: COMMERCIAL

## 2024-02-05 ENCOUNTER — OFFICE VISIT (OUTPATIENT)
Dept: OBSTETRICS AND GYNECOLOGY | Facility: HOSPITAL | Age: 29
End: 2024-02-05
Payer: COMMERCIAL

## 2024-02-05 VITALS — BODY MASS INDEX: 35.43 KG/M2 | WEIGHT: 200 LBS | DIASTOLIC BLOOD PRESSURE: 75 MMHG | SYSTOLIC BLOOD PRESSURE: 135 MMHG

## 2024-02-05 VITALS — BODY MASS INDEX: 35.25 KG/M2 | WEIGHT: 199 LBS | SYSTOLIC BLOOD PRESSURE: 128 MMHG | DIASTOLIC BLOOD PRESSURE: 92 MMHG

## 2024-02-05 DIAGNOSIS — Z34.90 PREGNANCY, UNSPECIFIED GESTATIONAL AGE: Primary | ICD-10-CM

## 2024-02-05 DIAGNOSIS — O10.919 CHRONIC HYPERTENSION DURING PREGNANCY, ANTEPARTUM: ICD-10-CM

## 2024-02-05 DIAGNOSIS — O10.919 CHRONIC HYPERTENSION DURING PREGNANCY, ANTEPARTUM: Primary | ICD-10-CM

## 2024-02-05 DIAGNOSIS — Z34.90 PRENATAL CARE, ANTEPARTUM: ICD-10-CM

## 2024-02-05 DIAGNOSIS — R13.19 ESOPHAGEAL DYSPHAGIA: ICD-10-CM

## 2024-02-05 DIAGNOSIS — O47.00 PRETERM CONTRACTIONS: ICD-10-CM

## 2024-02-05 LAB
GLUCOSE UR STRIP-MCNC: NEGATIVE MG/DL
PROT UR STRIP-MCNC: NEGATIVE MG/DL

## 2024-02-05 PROCEDURE — 99213 OFFICE O/P EST LOW 20 MIN: CPT

## 2024-02-05 PROCEDURE — 99213 OFFICE O/P EST LOW 20 MIN: CPT | Performed by: OBSTETRICS & GYNECOLOGY

## 2024-02-05 PROCEDURE — 76819 FETAL BIOPHYS PROFIL W/O NST: CPT | Performed by: OBSTETRICS & GYNECOLOGY

## 2024-02-05 PROCEDURE — 3078F DIAST BP <80 MM HG: CPT | Performed by: OBSTETRICS & GYNECOLOGY

## 2024-02-05 PROCEDURE — 76811 OB US DETAILED SNGL FETUS: CPT | Performed by: OBSTETRICS & GYNECOLOGY

## 2024-02-05 PROCEDURE — 76811 OB US DETAILED SNGL FETUS: CPT

## 2024-02-05 PROCEDURE — 76819 FETAL BIOPHYS PROFIL W/O NST: CPT

## 2024-02-05 PROCEDURE — 3075F SYST BP GE 130 - 139MM HG: CPT | Performed by: OBSTETRICS & GYNECOLOGY

## 2024-02-05 NOTE — PROGRESS NOTES
OB FOLLOW UP  CC- Here for care of pregnancy        Malena Stewart is a 29 y.o.  34w3d patient being seen today for her obstetrical follow up visit. Patient reports her BP's at home have been 115-120/70s. Acid reflux resolved with Protonix. Reports bx hx irregular contractions.     HA(s) that are mild and have been continuous through out pregnancy. No vision changes, upper abd pain, facial swelling.     Her prenatal care is complicated by (and status) :   Patient Active Problem List   Diagnosis    Ovarian ectopic pregnancy    Esophageal dysphagia    Encounter to determine fetal viability of pregnancy, fetus 1    Chronic hypertension during pregnancy, antepartum    Currently pregnant    Pregnancy     contractions    Third trimester pregnancy    Maternal anemia in pregnancy, antepartum       Flu Status: Declines  Ultrasound Today: Yes at PDC.   Non Stress Test: No    ROS - Headaches and irregular and BH contractions  Patient Denies: Loss of Fluid, Vaginal Spotting, Vision Changes, Nausea , Vomiting , Epigastric pain, and skin itching  Fetal Movement : normal  All other systems reviewed and are negative.     The additional following portions of the patient's history were reviewed and updated as appropriate: allergies and current medications.    I have reviewed and agree with the HPI, ROS, and historical information as entered above. Ivette Rojo, APRN      /92   Wt 90.3 kg (199 lb)   LMP 2023   BMI 35.25 kg/m²       EXAM:     Prenatal Vitals  BP: 128/92  Weight: 90.3 kg (199 lb)   Fetal Heart Rate: 145           Urine Glucose Read-only: Negative  Urine Protein Read-only: Negative       Assessment and Plan    Problem List Items Addressed This Visit       Chronic hypertension during pregnancy, antepartum    Overview     On Labetalol 50mg daily only r/t hypotension    Baseline PEP ALT 48  Baby aspirin 81mg daily  NST 2xweek  PDC 24         Relevant Medications    labetalol  (NORMODYNE) 100 MG tablet    Esophageal dysphagia    Overview     Protonix daily         Relevant Medications    pantoprazole (PROTONIX) 20 MG EC tablet    Pregnancy - Primary    Overview     Tdap 23  Declined flu vaccination         Relevant Orders    POC Urinalysis Dipstick (Completed)     contractions    Overview     24-   contractions resolved with terbutaline and 1 L LR bolus, no cervical change             Pregnancy at 34w3d  Fetal status reassuring.   PDC report reviewed.   Activity and Exercise discussed.  Discussed diastolic BP and potential need to increase Labetalol if stays elevated. She will continue to monitor and call for questions or concerns.   Fetal movement/PTL or Labor precautions  Patient is on Prenatal vitamins  Discussed/encouraged social distancing/COVID19 precautions; encouraged vaccination when able.  Reviewed Pre-eclampsia signs/symptoms.  Return in about 3 days (around 2024) for Kannan Dunn NST.    Ivette Rojo, APRN  2024

## 2024-02-05 NOTE — LETTER
"2024       No Recipients    Patient: Malena Stewart   YOB: 1995   Date of Visit: 2024       Dear TOBIAS Jacome,    Thank you for referring Malena Stewart to me for evaluation. Below is a copy of my consult note.    If you have questions, please do not hesitate to call me. I look forward to following Malena along with you.         Sincerely,        Rome Parra MD        CC:   No Recipients    Pt has appt with Dr. Yu's office today. NIPT negative. Pt states she was seen in triage last week for contractions and received 1 liter of IVF and a shot of terbutaline. Pt was 1cm/50-60% effaced. Pt states contractions are now intermittent. She denies vaginal bleeding or loss of fluid.Pt has history of an SAB and ectopic pregnancy treated with methotrexate.        Maternal/Fetal Medicine Consult Note   Date: 2024  Name: Malena Stewart    : 1995     MRN: [id number]     Referring Provider: TOBIAS Jacome    Chief Complaint  CHTN    Subjective     History of Present Illness:  Malena Stewart is a 29 y.o.  34w3d who presents today for consultation secondary to chronic hypertension    DARYL: Estimated Date of Delivery: 3/15/24     ROS:   Otherwise Noted in HPI    Past Medical History:   Diagnosis Date   • Acid reflux    • Anxiety    • Depression    • Ectopic pregnancy 2021    treated with methotrexate   • Endometrial polyp     severe bleeding and polyps-removed and \"cleaned up\" 3/2021   • Gum disease        • Herpes 2018    Oral   • History of mastitis 2013    Rt breast, required I&D, antibiotics, and packing   • Hypertension 2020    Initially started on lisinopril 20 mg   • Migraine     hasnt seen an MD for migraine   • Recurrent pregnancy loss, antepartum condition or complication 2021    Ectopic 2021, SAB 2021   • Varicella       Past Surgical History:   Procedure Laterality Date   • D & C HYSTEROSCOPY  2021 " "   with polypectomy   • DILATATION AND CURETTAGE  2021   • ENDOSCOPY  2021    GERD   • HYSTEROSCOPY  2021   • TOOTH EXTRACTION  2023   • WISDOM TOOTH EXTRACTION        OB History          6    Para   3    Term   3       0    AB   2    Living   3         SAB   1    IAB   0    Ectopic   1    Molar   0    Multiple   0    Live Births   3          Obstetric Comments   FOB#1-G1,2  FOB#2-G3,4,5,6    G4: pregnancy complicated by CHTN treated with labetalol and placental shelf                     Current Outpatient Medications:   •  ferrous sulfate 325 (65 FE) MG tablet, Take 1 tablet by mouth Daily With Breakfast., Disp: , Rfl:   •  labetalol (NORMODYNE) 100 MG tablet, Take 0.5 tablets by mouth 2 (Two) Times a Day. (Patient taking differently: Take 0.5 tablets by mouth Daily.), Disp: 30 tablet, Rfl: 2  •  pantoprazole (PROTONIX) 20 MG EC tablet, Take 1 tablet by mouth Daily., Disp: 30 tablet, Rfl: 2  •  prenatal vitamin (prenatal, CLASSIC, vitamin) tablet, Take  by mouth Daily., Disp: , Rfl:     Objective     Vital Signs  /75   Wt 90.7 kg (200 lb)   LMP 2023   Estimated body mass index is 35.43 kg/m² as calculated from the following:    Height as of 24: 160 cm (63\").    Weight as of this encounter: 90.7 kg (200 lb).    Ultrasound Impression:   See Viewpoint     Assessment and Plan     Malena Stewart is a 29 y.o.  34w3d who presents today for  consultation secondary to chronic hypertension    Diagnoses and all orders for this visit:    1. Chronic hypertension during pregnancy, antepartum (Primary)  Assessment & Plan:  Patient currently on 50 mg of labetalol once daily.  Baseline preeclampsia evaluation showed slight elevation in ALT at 48.  Patient currently receiving twice weekly nonstress test.  Patient is on aspirin prophylaxis for preeclampsia.  Overall fetal growth at the 52nd percentile and abdominal circumference at the 51st percentile today.  " Anatomy overall appears normal.  Follow-up ultrasounds at your discretion           Follow Up  Follow-up as clinically indicated    I spent 10 minutes caring for the patient on the day of service. This included: obtaining or reviewing a separately obtained medical history, reviewing patient records, performing a medically appropriate exam and/or evaluation, counseling or educating the patient/family/caregiver, ordering medications, labs, and/or procedures and documenting such in the medical record. This does not include time spent on review and interpretation of other tests such as fetal ultrasound or the performance of other procedures such as amniocentesis or CVS.      Rome Parra MD, FACOG  Maternal Fetal Medicine, [facility] [place]

## 2024-02-05 NOTE — ASSESSMENT & PLAN NOTE
Patient currently on 50 mg of labetalol once daily.  Baseline preeclampsia evaluation showed slight elevation in ALT at 48.  Patient currently receiving twice weekly nonstress test.  Patient is on aspirin prophylaxis for preeclampsia.  Overall fetal growth at the 52nd percentile and abdominal circumference at the 51st percentile today.  Anatomy overall appears normal.  Follow-up ultrasounds at your discretion

## 2024-02-05 NOTE — PROGRESS NOTES
"    Maternal/Fetal Medicine Consult Note   Date: 2024  Name: Malena Stewart    : 1995     MRN: [id number]     Referring Provider: TOBIAS Jacome    Chief Complaint  CHTN    Subjective     History of Present Illness:  Malena Stewart is a 29 y.o.  34w3d who presents today for consultation secondary to chronic hypertension    DARYL: Estimated Date of Delivery: 3/15/24     ROS:   Otherwise Noted in HPI    Past Medical History:   Diagnosis Date    Acid reflux     Anxiety     Depression     Ectopic pregnancy 2021    treated with methotrexate    Endometrial polyp     severe bleeding and polyps-removed and \"cleaned up\" 3/2021    Gum disease         Herpes     Oral    History of mastitis     Rt breast, required I&D, antibiotics, and packing    Hypertension     Initially started on lisinopril 20 mg    Migraine     hasnt seen an MD for migraine    Recurrent pregnancy loss, antepartum condition or complication 2021    Ectopic 2021, SAB 2021    Varicella 1997      Past Surgical History:   Procedure Laterality Date    D & C HYSTEROSCOPY  2021    with polypectomy    DILATATION AND CURETTAGE  2021    ENDOSCOPY  2021    GERD    HYSTEROSCOPY  2021    TOOTH EXTRACTION  2023    WISDOM TOOTH EXTRACTION        OB History          6    Para   3    Term   3       0    AB   2    Living   3         SAB   1    IAB   0    Ectopic   1    Molar   0    Multiple   0    Live Births   3          Obstetric Comments   FOB#1-G1,2  FOB#2-G3,4,5,6    G4: pregnancy complicated by CHTN treated with labetalol and placental shelf                     Current Outpatient Medications:     ferrous sulfate 325 (65 FE) MG tablet, Take 1 tablet by mouth Daily With Breakfast., Disp: , Rfl:     labetalol (NORMODYNE) 100 MG tablet, Take 0.5 tablets by mouth 2 (Two) Times a Day. (Patient taking differently: Take 0.5 tablets by mouth Daily.), " "Disp: 30 tablet, Rfl: 2    pantoprazole (PROTONIX) 20 MG EC tablet, Take 1 tablet by mouth Daily., Disp: 30 tablet, Rfl: 2    prenatal vitamin (prenatal, CLASSIC, vitamin) tablet, Take  by mouth Daily., Disp: , Rfl:     Objective     Vital Signs  /75   Wt 90.7 kg (200 lb)   LMP 2023   Estimated body mass index is 35.43 kg/m² as calculated from the following:    Height as of 24: 160 cm (63\").    Weight as of this encounter: 90.7 kg (200 lb).    Ultrasound Impression:   See Viewpoint     Assessment and Plan     Malena Stewart is a 29 y.o.  34w3d who presents today for  consultation secondary to chronic hypertension    Diagnoses and all orders for this visit:    1. Chronic hypertension during pregnancy, antepartum (Primary)  Assessment & Plan:  Patient currently on 50 mg of labetalol once daily.  Baseline preeclampsia evaluation showed slight elevation in ALT at 48.  Patient currently receiving twice weekly nonstress test.  Patient is on aspirin prophylaxis for preeclampsia.  Overall fetal growth at the 52nd percentile and abdominal circumference at the 51st percentile today.  Anatomy overall appears normal.  Follow-up ultrasounds at your discretion           Follow Up  Follow-up as clinically indicated    I spent 10 minutes caring for the patient on the day of service. This included: obtaining or reviewing a separately obtained medical history, reviewing patient records, performing a medically appropriate exam and/or evaluation, counseling or educating the patient/family/caregiver, ordering medications, labs, and/or procedures and documenting such in the medical record. This does not include time spent on review and interpretation of other tests such as fetal ultrasound or the performance of other procedures such as amniocentesis or CVS.      Rome Parra MD, FACOG  Maternal Fetal Medicine, [facility]    [facility]   "

## 2024-02-05 NOTE — PROGRESS NOTES
Pt has appt with Dr. Yu's office today. NIPT negative. Pt states she was seen in triage last week for contractions and received 1 liter of IVF and a shot of terbutaline. Pt was 1cm/50-60% effaced. Pt states contractions are now intermittent. She denies vaginal bleeding or loss of fluid.Pt has history of an SAB and ectopic pregnancy treated with methotrexate.

## 2024-02-08 ENCOUNTER — ROUTINE PRENATAL (OUTPATIENT)
Dept: OBSTETRICS AND GYNECOLOGY | Facility: CLINIC | Age: 29
End: 2024-02-08
Payer: COMMERCIAL

## 2024-02-08 VITALS — SYSTOLIC BLOOD PRESSURE: 126 MMHG | WEIGHT: 199 LBS | DIASTOLIC BLOOD PRESSURE: 82 MMHG | BODY MASS INDEX: 35.25 KG/M2

## 2024-02-08 DIAGNOSIS — O10.913 PRE-EXISTING HYPERTENSION COMPLICATING PREGNANCY IN THIRD TRIMESTER: Primary | ICD-10-CM

## 2024-02-08 DIAGNOSIS — Z34.90 PRENATAL CARE, ANTEPARTUM: ICD-10-CM

## 2024-02-08 LAB
GLUCOSE UR STRIP-MCNC: NEGATIVE MG/DL
PROT UR STRIP-MCNC: NEGATIVE MG/DL

## 2024-02-08 NOTE — PROGRESS NOTES
OB FOLLOW UP  CC- Here for care of pregnancy        Malena Stewart is a 29 y.o.  34w6d patient being seen today for her obstetrical follow up visit. Patient reports pelvic pressure and cramping, frequent soft BM's x 1 week. Denies diarrhea or vomiting.     Her prenatal care is complicated by (and status) :   Patient Active Problem List   Diagnosis    Ovarian ectopic pregnancy    Esophageal dysphagia    Encounter to determine fetal viability of pregnancy, fetus 1    Chronic hypertension during pregnancy, antepartum    Currently pregnant    Pregnancy     contractions    Third trimester pregnancy    Maternal anemia in pregnancy, antepartum       Flu Status: Declines  Ultrasound Today: No  Non Stress Test: Yes minutes 30+  non-stress test: NST: Reactive  indication: Hypertension  category: Category I    ROS -   Patient Denies: Loss of Fluid, Vaginal Spotting, Vision Changes, Headaches, Nausea , Vomiting , Contractions, Epigastric pain, and skin itching  Fetal Movement : normal  All other systems reviewed and are negative.       The additional following portions of the patient's history were reviewed and updated as appropriate: allergies, current medications, past medical history, past surgical history, and problem list.    I have reviewed and agree with the HPI, ROS, and historical information as entered above. Marisela Hansen, APRN      /82   Wt 90.3 kg (199 lb)   LMP 2023   BMI 35.25 kg/m²       EXAM:     Prenatal Vitals  BP: 126/82  Weight: 90.3 kg (199 lb)   Fetal Heart Rate: NST   Dilation/Effacement/Station  Dilation: 1  Effacement (%): 50  Station: -3           Urine Glucose Read-only: Negative  Urine Protein Read-only: Negative           Assessment and Plan    Problem List Items Addressed This Visit    None  Visit Diagnoses       Pre-existing hypertension complicating pregnancy in third trimester    -  Primary    Relevant Orders    POC Urinalysis Dipstick (Completed)     Prenatal care, antepartum        Relevant Orders    POC Urinalysis Dipstick (Completed)            Pregnancy at 34w6d  Fetal status reassuring.   Activity and Exercise discussed.  Fetal movement/PTL or Labor precautions  Reviewed Pre-eclampsia signs/symptoms  Return for NST twice weekly.    Marisela Hansen, APRN  02/08/2024

## 2024-02-12 ENCOUNTER — ROUTINE PRENATAL (OUTPATIENT)
Dept: OBSTETRICS AND GYNECOLOGY | Facility: CLINIC | Age: 29
End: 2024-02-12
Payer: COMMERCIAL

## 2024-02-12 VITALS — BODY MASS INDEX: 36.03 KG/M2 | WEIGHT: 203.4 LBS | DIASTOLIC BLOOD PRESSURE: 82 MMHG | SYSTOLIC BLOOD PRESSURE: 128 MMHG

## 2024-02-12 DIAGNOSIS — O10.919 CHRONIC HYPERTENSION DURING PREGNANCY, ANTEPARTUM: ICD-10-CM

## 2024-02-12 DIAGNOSIS — N89.8 VAGINAL DISCHARGE: ICD-10-CM

## 2024-02-12 DIAGNOSIS — Z34.93 PRENATAL CARE IN THIRD TRIMESTER: Primary | ICD-10-CM

## 2024-02-12 DIAGNOSIS — O99.019 MATERNAL ANEMIA IN PREGNANCY, ANTEPARTUM: ICD-10-CM

## 2024-02-12 DIAGNOSIS — Z34.93 THIRD TRIMESTER PREGNANCY: ICD-10-CM

## 2024-02-12 LAB
GLUCOSE UR STRIP-MCNC: NEGATIVE MG/DL
PROT UR STRIP-MCNC: NEGATIVE MG/DL

## 2024-02-12 PROCEDURE — 99213 OFFICE O/P EST LOW 20 MIN: CPT | Performed by: NURSE PRACTITIONER

## 2024-02-12 PROCEDURE — 59025 FETAL NON-STRESS TEST: CPT | Performed by: NURSE PRACTITIONER

## 2024-02-13 ENCOUNTER — ROUTINE PRENATAL (OUTPATIENT)
Dept: OBSTETRICS AND GYNECOLOGY | Facility: CLINIC | Age: 29
End: 2024-02-13
Payer: COMMERCIAL

## 2024-02-13 ENCOUNTER — TELEPHONE (OUTPATIENT)
Dept: OBSTETRICS AND GYNECOLOGY | Facility: CLINIC | Age: 29
End: 2024-02-13
Payer: COMMERCIAL

## 2024-02-13 VITALS — DIASTOLIC BLOOD PRESSURE: 80 MMHG | WEIGHT: 201.6 LBS | SYSTOLIC BLOOD PRESSURE: 128 MMHG | BODY MASS INDEX: 35.71 KG/M2

## 2024-02-13 DIAGNOSIS — Z34.93 PRENATAL CARE IN THIRD TRIMESTER: ICD-10-CM

## 2024-02-13 DIAGNOSIS — O10.919 CHRONIC HYPERTENSION DURING PREGNANCY, ANTEPARTUM: ICD-10-CM

## 2024-02-13 DIAGNOSIS — Z3A.35 35 WEEKS GESTATION OF PREGNANCY: Primary | ICD-10-CM

## 2024-02-13 DIAGNOSIS — R10.9 ABDOMINAL PAIN DURING PREGNANCY IN THIRD TRIMESTER: ICD-10-CM

## 2024-02-13 DIAGNOSIS — O26.893 ABDOMINAL PAIN DURING PREGNANCY IN THIRD TRIMESTER: ICD-10-CM

## 2024-02-13 DIAGNOSIS — O47.00 PRETERM CONTRACTIONS: ICD-10-CM

## 2024-02-13 DIAGNOSIS — O99.019 MATERNAL ANEMIA IN PREGNANCY, ANTEPARTUM: ICD-10-CM

## 2024-02-13 DIAGNOSIS — O10.919 CHRONIC HYPERTENSION AFFECTING PREGNANCY: ICD-10-CM

## 2024-02-13 LAB
A VAGINAE DNA VAG QL NAA+PROBE: NORMAL SCORE
BILIRUB BLD-MCNC: NEGATIVE MG/DL
BVAB2 DNA VAG QL NAA+PROBE: NORMAL SCORE
C ALBICANS DNA VAG QL NAA+PROBE: NEGATIVE
C GLABRATA DNA VAG QL NAA+PROBE: NEGATIVE
CLARITY, POC: CLEAR
COLOR UR: YELLOW
GLUCOSE UR STRIP-MCNC: NEGATIVE MG/DL
GLUCOSE UR STRIP-MCNC: NEGATIVE MG/DL
KETONES UR QL: NEGATIVE
LEUKOCYTE EST, POC: NEGATIVE
MEGA1 DNA VAG QL NAA+PROBE: NORMAL SCORE
NITRITE UR-MCNC: NEGATIVE MG/ML
PH UR: 6 [PH] (ref 5–8)
PROT UR STRIP-MCNC: NEGATIVE MG/DL
PROT UR STRIP-MCNC: NEGATIVE MG/DL
RBC # UR STRIP: NEGATIVE /UL
SP GR UR: 1.01 (ref 1–1.03)
UROBILINOGEN UR QL: NORMAL

## 2024-02-15 ENCOUNTER — ROUTINE PRENATAL (OUTPATIENT)
Dept: OBSTETRICS AND GYNECOLOGY | Facility: CLINIC | Age: 29
End: 2024-02-15
Payer: COMMERCIAL

## 2024-02-15 ENCOUNTER — LAB (OUTPATIENT)
Dept: LAB | Facility: HOSPITAL | Age: 29
End: 2024-02-15
Payer: COMMERCIAL

## 2024-02-15 VITALS — DIASTOLIC BLOOD PRESSURE: 78 MMHG | WEIGHT: 204 LBS | BODY MASS INDEX: 36.14 KG/M2 | SYSTOLIC BLOOD PRESSURE: 128 MMHG

## 2024-02-15 DIAGNOSIS — Z34.90 PRENATAL CARE, ANTEPARTUM: Primary | ICD-10-CM

## 2024-02-15 DIAGNOSIS — O10.919 CHRONIC HYPERTENSION DURING PREGNANCY, ANTEPARTUM: ICD-10-CM

## 2024-02-15 DIAGNOSIS — O99.019 MATERNAL ANEMIA IN PREGNANCY, ANTEPARTUM: ICD-10-CM

## 2024-02-15 DIAGNOSIS — Z3A.35 35 WEEKS GESTATION OF PREGNANCY: ICD-10-CM

## 2024-02-15 DIAGNOSIS — Z34.93 THIRD TRIMESTER PREGNANCY: ICD-10-CM

## 2024-02-15 LAB
BACTERIA UR CULT: NORMAL
BACTERIA UR CULT: NORMAL
GLUCOSE UR STRIP-MCNC: NEGATIVE MG/DL
PROT UR STRIP-MCNC: NEGATIVE MG/DL

## 2024-02-15 PROCEDURE — 87081 CULTURE SCREEN ONLY: CPT

## 2024-02-18 LAB — BACTERIA SPEC AEROBE CULT: NORMAL

## 2024-02-19 ENCOUNTER — ROUTINE PRENATAL (OUTPATIENT)
Dept: OBSTETRICS AND GYNECOLOGY | Facility: CLINIC | Age: 29
End: 2024-02-19
Payer: COMMERCIAL

## 2024-02-19 VITALS — SYSTOLIC BLOOD PRESSURE: 118 MMHG | DIASTOLIC BLOOD PRESSURE: 80 MMHG | BODY MASS INDEX: 36.14 KG/M2 | WEIGHT: 204 LBS

## 2024-02-19 DIAGNOSIS — O99.019 MATERNAL ANEMIA IN PREGNANCY, ANTEPARTUM: ICD-10-CM

## 2024-02-19 DIAGNOSIS — O10.919 CHRONIC HYPERTENSION DURING PREGNANCY, ANTEPARTUM: ICD-10-CM

## 2024-02-19 DIAGNOSIS — Z3A.36 36 WEEKS GESTATION OF PREGNANCY: Primary | ICD-10-CM

## 2024-02-19 DIAGNOSIS — R13.19 ESOPHAGEAL DYSPHAGIA: ICD-10-CM

## 2024-02-19 DIAGNOSIS — O47.00 PRETERM CONTRACTIONS: ICD-10-CM

## 2024-02-19 LAB
GLUCOSE UR STRIP-MCNC: NEGATIVE MG/DL
PROT UR STRIP-MCNC: NEGATIVE MG/DL

## 2024-02-19 PROCEDURE — 59025 FETAL NON-STRESS TEST: CPT

## 2024-02-19 PROCEDURE — 99213 OFFICE O/P EST LOW 20 MIN: CPT

## 2024-02-19 NOTE — PROGRESS NOTES
OB FOLLOW UP  CC- Here for care of pregnancy        Malena Stewart is a 29 y.o.  36w3d patient being seen today for her obstetrical follow up visit. Patient reports GI issues that started last week. Patient reports Intermittent nausea, diarrhea, and constipation. Saturday night got super sick. Symptoms only present at night.  Mild heartburn while taking Protonix 20mg. Patient also reports lower abdominal and back cramps as well as gas pain.  She reports a history of irritable bowel syndrome symptoms prior to pregnancy.  Saw GI MD but symptoms improved.     Her prenatal care is complicated by (and status) :  Patient Active Problem List   Diagnosis    Ovarian ectopic pregnancy    Esophageal dysphagia    Encounter to determine fetal viability of pregnancy, fetus 1    Chronic hypertension during pregnancy, antepartum    Currently pregnant    Pregnancy     contractions    Third trimester pregnancy    Maternal anemia in pregnancy, antepartum       GBS Status: was already done and is negative.    Allergies   Allergen Reactions    Amoxicillin Hives     Other reaction(s): Hives  Other reaction(s): Hives            Flu Status: Declines  Her Delivery Plan is:  wants to wait for baby to come on their own     US today: no  Reason for test:  Chronic hypertension on labetalol 100 mg daily  Date of Test: 2024  Time frame of test: 20 min.  RN NST Interpretation:  Reactive with 1 contraction      ROS -   Patient Denies: Loss of Fluid, Vaginal Spotting, Vision Changes, Headaches, Vomiting , Contractions, Epigastric pain, and skin itching  Fetal Movement : normal  All other systems reviewed and are negative.       The additional following portions of the patient's history were reviewed and updated as appropriate: allergies and current medications.    I have reviewed and agree with the HPI, ROS, and historical information as entered above. Ivette Rojo, APRN      EXAM:     Prenatal Vitals  BP:  118/80  Weight: 92.5 kg (204 lb)   Fetal Heart Rate: NST       Dilation/Effacement/Station  Dilation: 1  Effacement (%): 60  Station: -3    Urine Glucose Read-only: Negative  Urine Protein Read-only: Negative       Assessment and Plan    Problem List Items Addressed This Visit       Chronic hypertension during pregnancy, antepartum    Overview     On Labetalol 50mg daily only r/t hypotension    Baseline PEP ALT 48  Baby aspirin 81mg daily  NST 2xweek  PDC 24  Increased to 100mg QD.          Relevant Medications    labetalol (NORMODYNE) 100 MG tablet    Esophageal dysphagia    Overview     Protonix daily         Relevant Medications    pantoprazole (PROTONIX) 20 MG EC tablet    Maternal anemia in pregnancy, antepartum    Overview     Ferrous sulfate 325mg daily         Relevant Medications    ferrous sulfate 325 (65 FE) MG tablet    Pregnancy - Primary    Overview     Tdap 23  Declined flu vaccination         Relevant Orders    POC Urinalysis Dipstick (Completed)     contractions    Overview     24-   contractions resolved with terbutaline and 1 L LR bolus, no cervical change             Pregnancy at 36w3d  Fetal status reassuring.   Offered to increase heartburn medicine to 40 mg or switch to another heartburn medication.  Patient declined.  She states she will take Tums in addition as needed.  Zofran offered for nausea.  Patient declined.  Encouraged to eat frequent small meals Q1-2h, bland or dry foods, high protein meals or snacks, avoid spicy and greasy foods.  We discussed IBS diet to improve GI related symptoms.  Discussed/encouraged social distancing/COVID19 precautions; encouraged vaccination when able  Reviewed Pre-eclampsia signs/symptoms  Discussed options for IOL. Patient desires spontaneous labor. Would like to postpone an IOL unless medically indicated.   Delivery options reviewed with patient  Signs of labor reviewed  Kick counts reviewed  Activity and Exercise  discussed.  IBS education included in patient instructions.   Return in about 3 days (around 2/22/2024) for Kannan Dunn.    Ivette Rojo, TOBIAS  02/19/2024

## 2024-02-21 ENCOUNTER — TELEPHONE (OUTPATIENT)
Dept: OBSTETRICS AND GYNECOLOGY | Facility: CLINIC | Age: 29
End: 2024-02-21
Payer: COMMERCIAL

## 2024-02-21 NOTE — TELEPHONE ENCOUNTER
Pt calling because she has been having intermittent contractions since 12PM on 02/20/24. Pt reports she has low back pain/burning, her lower abdomen is tight, and she has intense pelvic pain. She is unsure if she should go to Labor and Delivery.

## 2024-02-21 NOTE — TELEPHONE ENCOUNTER
PT CALLED STATING SHE WOULD LIKE TO CANCEL HER APPT FOR TODAY SHE WANTS TO JUST KEEP HER APPT FOR TOMORROW PLEASE CALL     UNABLE TO WT CALL

## 2024-02-21 NOTE — TELEPHONE ENCOUNTER
Returned patient's call. States she does not want to come in today; she will wait until her scheduled appointment tomorrow and will go to L&D if symptoms worsen tonight. She denies any bleeding, leaking fluid, or regular contractions and reports goo fetal movement. She will call if those things change.

## 2024-02-21 NOTE — TELEPHONE ENCOUNTER
Returned patient's call. Patient of Dr. Brunner;  @ 36w 5d. States yesterday around noon she started having lower abdominal tightening and pelvic pain with intermittent lower back burning pain. States it got worse through the night and continues with same intensity now. Reports she feels horrible; nauseous with a H/A. Having irregular contractions. Has some increase in vaginal discharge. Having body aches and chills intermittently for past 2-3 weeks. States she has lots of GI issues.   Denies any fever; Temp 97.8 now. Denies any bleeding or leaking fluid. Denies s/s of UTI.  Reports normal fetal movement. Next prenatal visit is scheduled for tomorrow.   Offered appointment today. She v/u and agreed. Appointment scheduled.

## 2024-02-22 ENCOUNTER — ROUTINE PRENATAL (OUTPATIENT)
Dept: OBSTETRICS AND GYNECOLOGY | Facility: CLINIC | Age: 29
End: 2024-02-22
Payer: COMMERCIAL

## 2024-02-22 VITALS — SYSTOLIC BLOOD PRESSURE: 118 MMHG | BODY MASS INDEX: 36.28 KG/M2 | DIASTOLIC BLOOD PRESSURE: 84 MMHG | WEIGHT: 204.8 LBS

## 2024-02-22 DIAGNOSIS — Z34.90 PRENATAL CARE, ANTEPARTUM: ICD-10-CM

## 2024-02-22 DIAGNOSIS — Z3A.36 36 WEEKS GESTATION OF PREGNANCY: Primary | ICD-10-CM

## 2024-02-22 LAB
GLUCOSE UR STRIP-MCNC: NEGATIVE MG/DL
PROT UR STRIP-MCNC: NEGATIVE MG/DL

## 2024-02-22 NOTE — PROGRESS NOTES
OB FOLLOW UP  CC- Here for care of pregnancy        Malena Stewart is a 29 y.o.  36w6d patient being seen today for her obstetrical follow up visit. Patient reports her BP's at home have been 120s/80s with the Labetolol increase of 100mg daily. She reports on Saturday night she started having increased burning sensation in lower back that went away on . Then Tuesday the pain started again.   She reports pelvic pressure and tightening when going from sitting to standing. She has to stop and pace herself because the pain is so bad. She reports irregular BH contractions with the back and pelvic pain, but they go away on their own. She denies any urinary symptoms.     Her prenatal care is complicated by (and status) :  Patient Active Problem List   Diagnosis    Ovarian ectopic pregnancy    Esophageal dysphagia    Encounter to determine fetal viability of pregnancy, fetus 1    Chronic hypertension during pregnancy, antepartum    Currently pregnant    Pregnancy     contractions    Third trimester pregnancy    Maternal anemia in pregnancy, antepartum       GBS Status: was already done and is negative.    Allergies   Allergen Reactions    Amoxicillin Hives     Other reaction(s): Hives  Other reaction(s): Hives            Flu Status: Declines  Her Delivery Plan is: Undecided    US today: no  Non Stress Test: Yes minutes 20  non-stress test: NST: Reactive  indication: Hypertension  category: Category I  attestation:  .me    ROS -   Patient Denies: Loss of Fluid, Vaginal Spotting, Vision Changes, Headaches, Nausea , Vomiting , Epigastric pain, and skin itching  Fetal Movement : normal  All other systems reviewed and are negative.       The additional following portions of the patient's history were reviewed and updated as appropriate: allergies, current medications, past family history, past medical history, past social history, past surgical history, and problem list.    I have reviewed and agree  with the HPI, ROS, and historical information as entered above. Donte Brunner MD        EXAM:     Prenatal Vitals  BP: 118/84  Weight: 92.9 kg (204 lb 12.8 oz)   Fetal Heart Rate: NST              Urine Glucose Read-only: Negative  Urine Protein Read-only: Negative           Assessment and Plan    Problem List Items Addressed This Visit          Gravid and     Pregnancy - Primary    Overview     Tdap 23  Declined flu vaccination          Other Visit Diagnoses       Prenatal care, antepartum        Relevant Orders    POC Urinalysis Dipstick (Completed)            Pregnancy at 36w6d  Fetal status reassuring.   Reviewed Pre-eclampsia signs/symptoms  Delivery options reviewed with patient  Signs of labor reviewed  Kick counts reviewed  Activity and Exercise discussed.  Return in about 5 days (around 2024) for nst.    Donte Brunner MD  2024

## 2024-02-27 ENCOUNTER — ROUTINE PRENATAL (OUTPATIENT)
Dept: OBSTETRICS AND GYNECOLOGY | Facility: CLINIC | Age: 29
End: 2024-02-27
Payer: COMMERCIAL

## 2024-02-27 VITALS — DIASTOLIC BLOOD PRESSURE: 84 MMHG | BODY MASS INDEX: 36.24 KG/M2 | WEIGHT: 204.6 LBS | SYSTOLIC BLOOD PRESSURE: 122 MMHG

## 2024-02-27 DIAGNOSIS — Z34.93 PRENATAL CARE IN THIRD TRIMESTER: Primary | ICD-10-CM

## 2024-02-27 LAB
GLUCOSE UR STRIP-MCNC: NEGATIVE MG/DL
PROT UR STRIP-MCNC: NEGATIVE MG/DL

## 2024-02-27 NOTE — PROGRESS NOTES
OB FOLLOW UP  CC- Here for care of pregnancy        Malena Stewart is a 29 y.o.  37w4d patient being seen today for her obstetrical follow up visit. Patient reports lower abdominal cramping and nausea. She reports her hernia has gotten bigger and this morning she had severe/sharp abdominal pain for a few hours with a black tarry BM.     Her blood pressures at home have been 120s/80s.      Her prenatal care is complicated by (and status) :   Patient Active Problem List   Diagnosis    Ovarian ectopic pregnancy    Esophageal dysphagia    Encounter to determine fetal viability of pregnancy, fetus 1    Chronic hypertension during pregnancy, antepartum    Currently pregnant    Pregnancy     contractions    Third trimester pregnancy    Maternal anemia in pregnancy, antepartum       GBS Status:   Group B Strep Culture   Date Value Ref Range Status   02/15/2024 No Group B Streptococcus Isolated at 2 days  Final         Allergies   Allergen Reactions    Amoxicillin Hives     Other reaction(s): Hives  Other reaction(s): Hives          Her Delivery Plan is:  IOL 3/8/24     US today: no  Non Stress Test: Yes minutes 20  non-stress test: NST: Reactive  indication: Hypertension  category: Category I  attestation:     ROS -   Patient Denies: Loss of Fluid, Vaginal Spotting, Vision Changes, Headaches, Vomiting , Epigastric pain, and skin itching  Fetal Movement : normal  All other systems reviewed and are negative.       The additional following portions of the patient's history were reviewed and updated as appropriate: allergies and current medications.    I have reviewed and agree with the HPI, ROS, and historical information as entered above. Donte Brunner MD        EXAM: Cx 2/70/-2 membranes stripped     Prenatal Vitals  BP: 122/84  Weight: 92.8 kg (204 lb 9.6 oz)                  Urine Glucose Read-only: Negative  Urine Protein Read-only: Negative           Assessment and Plan    Problem List Items  Addressed This Visit    None  Visit Diagnoses       Prenatal care in third trimester    -  Primary    Relevant Orders    POC Urinalysis Dipstick (Completed)    Fetal Nonstress Test            Pregnancy at 37w4d  Fetal status reassuring.   Reviewed Pre-eclampsia signs/symptoms  BP ok and we will see her in 3 days for NST   Delivery options reviewed with patient  Signs of labor reviewed  Kick counts reviewed  Activity and Exercise discussed.  Return in about 3 days (around 3/1/2024). NST then     Donte Brunner MD  02/27/2024

## 2024-03-01 ENCOUNTER — PREP FOR SURGERY (OUTPATIENT)
Dept: OTHER | Facility: HOSPITAL | Age: 29
End: 2024-03-01
Payer: COMMERCIAL

## 2024-03-01 ENCOUNTER — ROUTINE PRENATAL (OUTPATIENT)
Dept: OBSTETRICS AND GYNECOLOGY | Facility: CLINIC | Age: 29
End: 2024-03-01
Payer: COMMERCIAL

## 2024-03-01 VITALS — DIASTOLIC BLOOD PRESSURE: 84 MMHG | WEIGHT: 205.8 LBS | BODY MASS INDEX: 36.46 KG/M2 | SYSTOLIC BLOOD PRESSURE: 128 MMHG

## 2024-03-01 DIAGNOSIS — O10.919 CHRONIC HYPERTENSION AFFECTING PREGNANCY: ICD-10-CM

## 2024-03-01 DIAGNOSIS — Z34.93 PRENATAL CARE IN THIRD TRIMESTER: Primary | ICD-10-CM

## 2024-03-01 DIAGNOSIS — Z34.83 PRENATAL CARE, SUBSEQUENT PREGNANCY, THIRD TRIMESTER: Primary | ICD-10-CM

## 2024-03-01 LAB
GLUCOSE UR STRIP-MCNC: NEGATIVE MG/DL
PROT UR STRIP-MCNC: ABNORMAL MG/DL

## 2024-03-01 RX ORDER — MORPHINE SULFATE 2 MG/ML
2 INJECTION, SOLUTION INTRAMUSCULAR; INTRAVENOUS
Status: CANCELLED | OUTPATIENT
Start: 2024-03-01 | End: 2024-03-11

## 2024-03-01 RX ORDER — MISOPROSTOL 200 UG/1
800 TABLET ORAL AS NEEDED
Status: CANCELLED | OUTPATIENT
Start: 2024-03-01

## 2024-03-01 RX ORDER — OXYTOCIN/0.9 % SODIUM CHLORIDE 30/500 ML
999 PLASTIC BAG, INJECTION (ML) INTRAVENOUS ONCE
Status: CANCELLED | OUTPATIENT
Start: 2024-03-01 | End: 2024-03-01

## 2024-03-01 RX ORDER — PROMETHAZINE HYDROCHLORIDE 12.5 MG/1
12.5 SUPPOSITORY RECTAL EVERY 6 HOURS PRN
Status: CANCELLED | OUTPATIENT
Start: 2024-03-01

## 2024-03-01 RX ORDER — LIDOCAINE HYDROCHLORIDE 10 MG/ML
0.5 INJECTION, SOLUTION EPIDURAL; INFILTRATION; INTRACAUDAL; PERINEURAL ONCE AS NEEDED
Status: CANCELLED | OUTPATIENT
Start: 2024-03-01

## 2024-03-01 RX ORDER — SODIUM CHLORIDE 0.9 % (FLUSH) 0.9 %
10 SYRINGE (ML) INJECTION AS NEEDED
Status: CANCELLED | OUTPATIENT
Start: 2024-03-01

## 2024-03-01 RX ORDER — CARBOPROST TROMETHAMINE 250 UG/ML
250 INJECTION, SOLUTION INTRAMUSCULAR AS NEEDED
Status: CANCELLED | OUTPATIENT
Start: 2024-03-01

## 2024-03-01 RX ORDER — MAGNESIUM CARB/ALUMINUM HYDROX 105-160MG
30 TABLET,CHEWABLE ORAL ONCE
Status: CANCELLED | OUTPATIENT
Start: 2024-03-01 | End: 2024-03-01

## 2024-03-01 RX ORDER — SODIUM CHLORIDE 0.9 % (FLUSH) 0.9 %
10 SYRINGE (ML) INJECTION EVERY 12 HOURS SCHEDULED
Status: CANCELLED | OUTPATIENT
Start: 2024-03-01

## 2024-03-01 RX ORDER — METHYLERGONOVINE MALEATE 0.2 MG/ML
200 INJECTION INTRAVENOUS ONCE AS NEEDED
Status: CANCELLED | OUTPATIENT
Start: 2024-03-01

## 2024-03-01 RX ORDER — PROMETHAZINE HYDROCHLORIDE 12.5 MG/1
12.5 TABLET ORAL EVERY 6 HOURS PRN
Status: CANCELLED | OUTPATIENT
Start: 2024-03-01

## 2024-03-01 RX ORDER — OXYTOCIN/0.9 % SODIUM CHLORIDE 30/500 ML
2-30 PLASTIC BAG, INJECTION (ML) INTRAVENOUS
Status: CANCELLED | OUTPATIENT
Start: 2024-03-08

## 2024-03-01 RX ORDER — SODIUM CHLORIDE 9 MG/ML
40 INJECTION, SOLUTION INTRAVENOUS AS NEEDED
Status: CANCELLED | OUTPATIENT
Start: 2024-03-01

## 2024-03-01 RX ORDER — ACETAMINOPHEN 325 MG/1
650 TABLET ORAL EVERY 4 HOURS PRN
Status: CANCELLED | OUTPATIENT
Start: 2024-03-01

## 2024-03-01 RX ORDER — OXYCODONE AND ACETAMINOPHEN 7.5; 325 MG/1; MG/1
2 TABLET ORAL EVERY 4 HOURS PRN
Status: CANCELLED | OUTPATIENT
Start: 2024-03-01 | End: 2024-03-11

## 2024-03-01 RX ORDER — SODIUM CHLORIDE, SODIUM LACTATE, POTASSIUM CHLORIDE, CALCIUM CHLORIDE 600; 310; 30; 20 MG/100ML; MG/100ML; MG/100ML; MG/100ML
125 INJECTION, SOLUTION INTRAVENOUS CONTINUOUS
Status: CANCELLED | OUTPATIENT
Start: 2024-03-01

## 2024-03-01 RX ORDER — OXYTOCIN/0.9 % SODIUM CHLORIDE 30/500 ML
250 PLASTIC BAG, INJECTION (ML) INTRAVENOUS CONTINUOUS
Status: CANCELLED | OUTPATIENT
Start: 2024-03-01 | End: 2024-03-01

## 2024-03-01 NOTE — PROGRESS NOTES
OB FOLLOW UP  CC- Here for care of pregnancy        Malena Stewart is a 29 y.o.  38w0d patient being seen today for her obstetrical follow up visit. Patient reports that she lost her mucus plug yesterday and has had a lot of mucus discharge since then. She reports that since she lost her mucus plug yesterday, she has had irregular contractions that occur every 8-10 minutes that last around one hour each time they happen. Home BP has been ranging 120's/80's. She reports some nausea.     Her prenatal care is complicated by (and status) :   Patient Active Problem List   Diagnosis    Ovarian ectopic pregnancy    Esophageal dysphagia    Encounter to determine fetal viability of pregnancy, fetus 1    Chronic hypertension during pregnancy, antepartum    Currently pregnant    Pregnancy     contractions    Third trimester pregnancy    Maternal anemia in pregnancy, antepartum       GBS Status: negative  Group B Strep Culture   Date Value Ref Range Status   02/15/2024 No Group B Streptococcus Isolated at 2 days  Final         Allergies   Allergen Reactions    Amoxicillin Hives     Other reaction(s): Hives  Other reaction(s): Hives            Flu Status: Declines  Her Delivery Plan is:  IOL of labor 3-8-24 @ 0600     US today: no  Non Stress Test: Yes minutes 20+  non-stress test: NST: Reactive  indication: Hypertension  category: Category I  attestation:  .kezia Brunner MD      ROS -   Patient Denies: Loss of Fluid, Vaginal Spotting, Vision Changes, Headaches, Nausea , Epigastric pain, and skin itching  Fetal Movement : normal  All other systems reviewed and are negative.       The additional following portions of the patient's history were reviewed and updated as appropriate: allergies, current medications, past family history, past medical history, past social history, past surgical history, and problem list.    I have reviewed and agree with the HPI, ROS, and historical information as entered  above. Donte Brunner MD        EXAM:     Prenatal Vitals  BP: 128/84  Weight: 93.4 kg (205 lb 12.8 oz)   Fetal Heart Rate: 155              Urine Glucose Read-only: Negative  Urine Protein Read-only: (!) Trace           Assessment and Plan    Problem List Items Addressed This Visit    None  Visit Diagnoses       Prenatal care in third trimester    -  Primary    Relevant Orders    POC Urinalysis Dipstick (Completed)    Chronic hypertension affecting pregnancy                Pregnancy at 38w0d  Fetal status reassuring.   Reviewed Pre-eclampsia signs/symptoms  Reviewed upcoming IOL with patient. Instructions given.  Delivery options reviewed with patient  Signs of labor reviewed  Kick counts reviewed  Activity and Exercise discussed.  Return for IOL next thursday .    Donte Brunner MD  03/01/2024

## 2024-03-07 ENCOUNTER — HOSPITAL ENCOUNTER (INPATIENT)
Facility: HOSPITAL | Age: 29
LOS: 2 days | Discharge: HOME | End: 2024-03-10
Attending: OBSTETRICS & GYNECOLOGY | Admitting: OBSTETRICS & GYNECOLOGY
Payer: COMMERCIAL

## 2024-03-07 ENCOUNTER — HOSPITAL ENCOUNTER (OUTPATIENT)
Dept: LABOR AND DELIVERY | Facility: HOSPITAL | Age: 29
Discharge: HOME | End: 2024-03-07
Payer: COMMERCIAL

## 2024-03-07 DIAGNOSIS — Z34.83 PRENATAL CARE, SUBSEQUENT PREGNANCY, THIRD TRIMESTER: ICD-10-CM

## 2024-03-07 LAB
ALP SERPL-CCNC: 107 U/L (ref 39–117)
ALT SERPL W P-5'-P-CCNC: 12 U/L (ref 1–33)
AST SERPL-CCNC: 18 U/L (ref 1–32)
BILIRUB SERPL-MCNC: 0.3 MG/DL (ref 0–1.2)
CREAT SERPL-MCNC: 0.53 MG/DL (ref 0.57–1)
DEPRECATED RDW RBC AUTO: 44.7 FL (ref 37–54)
ERYTHROCYTE [DISTWIDTH] IN BLOOD BY AUTOMATED COUNT: 14.1 % (ref 12.3–15.4)
HCT VFR BLD AUTO: 35 % (ref 34–46.6)
HGB BLD-MCNC: 11.5 G/DL (ref 12–15.9)
LDH SERPL-CCNC: 242 U/L (ref 135–214)
MCH RBC QN AUTO: 28.4 PG (ref 26.6–33)
MCHC RBC AUTO-ENTMCNC: 32.9 G/DL (ref 31.5–35.7)
MCV RBC AUTO: 86.4 FL (ref 79–97)
PLATELET # BLD AUTO: 176 10*3/MM3 (ref 140–450)
PMV BLD AUTO: 12.4 FL (ref 6–12)
RBC # BLD AUTO: 4.05 10*6/MM3 (ref 3.77–5.28)
URATE SERPL-MCNC: 4.4 MG/DL (ref 2.4–5.7)
WBC NRBC COR # BLD AUTO: 11.45 10*3/MM3 (ref 3.4–10.8)

## 2024-03-07 PROCEDURE — 86850 RBC ANTIBODY SCREEN: CPT | Performed by: OBSTETRICS & GYNECOLOGY

## 2024-03-07 PROCEDURE — 83615 LACTATE (LD) (LDH) ENZYME: CPT | Performed by: OBSTETRICS & GYNECOLOGY

## 2024-03-07 PROCEDURE — 84075 ASSAY ALKALINE PHOSPHATASE: CPT | Performed by: OBSTETRICS & GYNECOLOGY

## 2024-03-07 PROCEDURE — 86780 TREPONEMA PALLIDUM: CPT | Performed by: OBSTETRICS & GYNECOLOGY

## 2024-03-07 PROCEDURE — 84550 ASSAY OF BLOOD/URIC ACID: CPT | Performed by: OBSTETRICS & GYNECOLOGY

## 2024-03-07 PROCEDURE — 86900 BLOOD TYPING SEROLOGIC ABO: CPT | Performed by: OBSTETRICS & GYNECOLOGY

## 2024-03-07 PROCEDURE — 25810000003 LACTATED RINGERS SOLUTION: Performed by: OBSTETRICS & GYNECOLOGY

## 2024-03-07 PROCEDURE — 85027 COMPLETE CBC AUTOMATED: CPT | Performed by: OBSTETRICS & GYNECOLOGY

## 2024-03-07 PROCEDURE — 82565 ASSAY OF CREATININE: CPT | Performed by: OBSTETRICS & GYNECOLOGY

## 2024-03-07 PROCEDURE — 84460 ALANINE AMINO (ALT) (SGPT): CPT | Performed by: OBSTETRICS & GYNECOLOGY

## 2024-03-07 PROCEDURE — 36415 COLL VENOUS BLD VENIPUNCTURE: CPT | Performed by: OBSTETRICS & GYNECOLOGY

## 2024-03-07 PROCEDURE — 84450 TRANSFERASE (AST) (SGOT): CPT | Performed by: OBSTETRICS & GYNECOLOGY

## 2024-03-07 PROCEDURE — 82247 BILIRUBIN TOTAL: CPT | Performed by: OBSTETRICS & GYNECOLOGY

## 2024-03-07 PROCEDURE — 86901 BLOOD TYPING SEROLOGIC RH(D): CPT | Performed by: OBSTETRICS & GYNECOLOGY

## 2024-03-07 RX ORDER — MAGNESIUM CARB/ALUMINUM HYDROX 105-160MG
30 TABLET,CHEWABLE ORAL ONCE
Status: DISCONTINUED | OUTPATIENT
Start: 2024-03-07 | End: 2024-03-08 | Stop reason: HOSPADM

## 2024-03-07 RX ORDER — ACETAMINOPHEN 325 MG/1
650 TABLET ORAL EVERY 4 HOURS PRN
Status: DISCONTINUED | OUTPATIENT
Start: 2024-03-07 | End: 2024-03-08 | Stop reason: HOSPADM

## 2024-03-07 RX ORDER — SODIUM CHLORIDE, SODIUM LACTATE, POTASSIUM CHLORIDE, CALCIUM CHLORIDE 600; 310; 30; 20 MG/100ML; MG/100ML; MG/100ML; MG/100ML
125 INJECTION, SOLUTION INTRAVENOUS CONTINUOUS
Status: DISCONTINUED | OUTPATIENT
Start: 2024-03-07 | End: 2024-03-08

## 2024-03-07 RX ORDER — OXYTOCIN/0.9 % SODIUM CHLORIDE 30/500 ML
2-30 PLASTIC BAG, INJECTION (ML) INTRAVENOUS
Status: DISCONTINUED | OUTPATIENT
Start: 2024-03-08 | End: 2024-03-07

## 2024-03-07 RX ORDER — SODIUM CHLORIDE 0.9 % (FLUSH) 0.9 %
10 SYRINGE (ML) INJECTION AS NEEDED
Status: DISCONTINUED | OUTPATIENT
Start: 2024-03-07 | End: 2024-03-08 | Stop reason: HOSPADM

## 2024-03-07 RX ORDER — OXYTOCIN/0.9 % SODIUM CHLORIDE 30/500 ML
2-30 PLASTIC BAG, INJECTION (ML) INTRAVENOUS
Status: DISCONTINUED | OUTPATIENT
Start: 2024-03-08 | End: 2024-03-08 | Stop reason: HOSPADM

## 2024-03-07 RX ORDER — SODIUM CHLORIDE 9 MG/ML
40 INJECTION, SOLUTION INTRAVENOUS AS NEEDED
Status: DISCONTINUED | OUTPATIENT
Start: 2024-03-07 | End: 2024-03-08 | Stop reason: HOSPADM

## 2024-03-07 RX ORDER — SODIUM CHLORIDE 0.9 % (FLUSH) 0.9 %
10 SYRINGE (ML) INJECTION EVERY 12 HOURS SCHEDULED
Status: DISCONTINUED | OUTPATIENT
Start: 2024-03-07 | End: 2024-03-08 | Stop reason: HOSPADM

## 2024-03-07 RX ORDER — LIDOCAINE HYDROCHLORIDE 10 MG/ML
0.5 INJECTION, SOLUTION EPIDURAL; INFILTRATION; INTRACAUDAL; PERINEURAL ONCE AS NEEDED
Status: DISCONTINUED | OUTPATIENT
Start: 2024-03-07 | End: 2024-03-08 | Stop reason: HOSPADM

## 2024-03-07 RX ADMIN — SODIUM CHLORIDE, POTASSIUM CHLORIDE, SODIUM LACTATE AND CALCIUM CHLORIDE 1000 ML: 600; 310; 30; 20 INJECTION, SOLUTION INTRAVENOUS at 22:47

## 2024-03-08 ENCOUNTER — ANESTHESIA EVENT (OUTPATIENT)
Dept: LABOR AND DELIVERY | Facility: HOSPITAL | Age: 29
End: 2024-03-08
Payer: COMMERCIAL

## 2024-03-08 ENCOUNTER — ANESTHESIA (OUTPATIENT)
Dept: LABOR AND DELIVERY | Facility: HOSPITAL | Age: 29
End: 2024-03-08
Payer: COMMERCIAL

## 2024-03-08 PROBLEM — Z34.83 PRENATAL CARE, SUBSEQUENT PREGNANCY, THIRD TRIMESTER: Status: ACTIVE | Noted: 2024-03-08

## 2024-03-08 PROBLEM — Z34.90 ENCOUNTER FOR INDUCTION OF LABOR: Status: ACTIVE | Noted: 2024-03-08

## 2024-03-08 LAB
ABO GROUP BLD: NORMAL
BLD GP AB SCN SERPL QL: NEGATIVE
RH BLD: POSITIVE
T PALLIDUM IGG SER QL: NORMAL
T&S EXPIRATION DATE: NORMAL

## 2024-03-08 PROCEDURE — 25010000002 FENTANYL CITRATE (PF) 50 MCG/ML SOLUTION: Performed by: ANESTHESIOLOGY

## 2024-03-08 PROCEDURE — 25810000003 LACTATED RINGERS PER 1000 ML: Performed by: OBSTETRICS & GYNECOLOGY

## 2024-03-08 PROCEDURE — 25010000002 ROPIVACAINE PER 1 MG: Performed by: ANESTHESIOLOGY

## 2024-03-08 PROCEDURE — C1755 CATHETER, INTRASPINAL: HCPCS

## 2024-03-08 PROCEDURE — C1755 CATHETER, INTRASPINAL: HCPCS | Performed by: ANESTHESIOLOGY

## 2024-03-08 PROCEDURE — 59025 FETAL NON-STRESS TEST: CPT

## 2024-03-08 PROCEDURE — 59410 OBSTETRICAL CARE: CPT | Performed by: OBSTETRICS & GYNECOLOGY

## 2024-03-08 PROCEDURE — 25010000002 BUPIVACAINE (PF) 0.25 % SOLUTION: Performed by: ANESTHESIOLOGY

## 2024-03-08 PROCEDURE — S0260 H&P FOR SURGERY: HCPCS | Performed by: OBSTETRICS & GYNECOLOGY

## 2024-03-08 PROCEDURE — 51702 INSERT TEMP BLADDER CATH: CPT

## 2024-03-08 RX ORDER — ACETAMINOPHEN 325 MG/1
650 TABLET ORAL EVERY 6 HOURS PRN
Status: DISCONTINUED | OUTPATIENT
Start: 2024-03-08 | End: 2024-03-10 | Stop reason: HOSPADM

## 2024-03-08 RX ORDER — LABETALOL 100 MG/1
50 TABLET, FILM COATED ORAL 2 TIMES DAILY
Status: DISCONTINUED | OUTPATIENT
Start: 2024-03-08 | End: 2024-03-10 | Stop reason: HOSPADM

## 2024-03-08 RX ORDER — PROMETHAZINE HYDROCHLORIDE 12.5 MG/1
12.5 SUPPOSITORY RECTAL EVERY 6 HOURS PRN
Status: DISCONTINUED | OUTPATIENT
Start: 2024-03-08 | End: 2024-03-08 | Stop reason: HOSPADM

## 2024-03-08 RX ORDER — HYDROCODONE BITARTRATE AND ACETAMINOPHEN 5; 325 MG/1; MG/1
1 TABLET ORAL EVERY 4 HOURS PRN
Status: DISCONTINUED | OUTPATIENT
Start: 2024-03-08 | End: 2024-03-10 | Stop reason: HOSPADM

## 2024-03-08 RX ORDER — OXYTOCIN/0.9 % SODIUM CHLORIDE 30/500 ML
250 PLASTIC BAG, INJECTION (ML) INTRAVENOUS CONTINUOUS
Status: ACTIVE | OUTPATIENT
Start: 2024-03-08 | End: 2024-03-08

## 2024-03-08 RX ORDER — ROPIVACAINE HYDROCHLORIDE 5 MG/ML
INJECTION, SOLUTION EPIDURAL; INFILTRATION; PERINEURAL AS NEEDED
Status: DISCONTINUED | OUTPATIENT
Start: 2024-03-08 | End: 2024-03-08 | Stop reason: SURG

## 2024-03-08 RX ORDER — ROPIVACAINE HYDROCHLORIDE 2 MG/ML
15 INJECTION, SOLUTION EPIDURAL; INFILTRATION; PERINEURAL CONTINUOUS
Status: DISCONTINUED | OUTPATIENT
Start: 2024-03-08 | End: 2024-03-08

## 2024-03-08 RX ORDER — PROMETHAZINE HYDROCHLORIDE 25 MG/1
25 TABLET ORAL EVERY 6 HOURS PRN
Status: DISCONTINUED | OUTPATIENT
Start: 2024-03-08 | End: 2024-03-10 | Stop reason: HOSPADM

## 2024-03-08 RX ORDER — LIDOCAINE HCL/EPINEPHRINE/PF 2%-1:200K
VIAL (ML) INJECTION AS NEEDED
Status: DISCONTINUED | OUTPATIENT
Start: 2024-03-08 | End: 2024-03-08 | Stop reason: SURG

## 2024-03-08 RX ORDER — DIPHENHYDRAMINE HYDROCHLORIDE 50 MG/ML
12.5 INJECTION INTRAMUSCULAR; INTRAVENOUS EVERY 8 HOURS PRN
Status: DISCONTINUED | OUTPATIENT
Start: 2024-03-08 | End: 2024-03-08 | Stop reason: HOSPADM

## 2024-03-08 RX ORDER — PROMETHAZINE HYDROCHLORIDE 12.5 MG/1
12.5 TABLET ORAL EVERY 6 HOURS PRN
Status: DISCONTINUED | OUTPATIENT
Start: 2024-03-08 | End: 2024-03-08 | Stop reason: HOSPADM

## 2024-03-08 RX ORDER — HYDROCORTISONE 25 MG/G
1 CREAM TOPICAL AS NEEDED
Status: DISCONTINUED | OUTPATIENT
Start: 2024-03-08 | End: 2024-03-10 | Stop reason: HOSPADM

## 2024-03-08 RX ORDER — OXYCODONE AND ACETAMINOPHEN 7.5; 325 MG/1; MG/1
2 TABLET ORAL EVERY 4 HOURS PRN
Status: DISCONTINUED | OUTPATIENT
Start: 2024-03-08 | End: 2024-03-08 | Stop reason: HOSPADM

## 2024-03-08 RX ORDER — EPHEDRINE SULFATE 5 MG/ML
10 INJECTION INTRAVENOUS
Status: DISCONTINUED | OUTPATIENT
Start: 2024-03-08 | End: 2024-03-08 | Stop reason: HOSPADM

## 2024-03-08 RX ORDER — ACETAMINOPHEN 325 MG/1
650 TABLET ORAL EVERY 4 HOURS PRN
Status: DISCONTINUED | OUTPATIENT
Start: 2024-03-08 | End: 2024-03-08 | Stop reason: HOSPADM

## 2024-03-08 RX ORDER — FAMOTIDINE 10 MG/ML
20 INJECTION, SOLUTION INTRAVENOUS ONCE AS NEEDED
Status: DISCONTINUED | OUTPATIENT
Start: 2024-03-08 | End: 2024-03-08 | Stop reason: HOSPADM

## 2024-03-08 RX ORDER — OXYTOCIN/0.9 % SODIUM CHLORIDE 30/500 ML
999 PLASTIC BAG, INJECTION (ML) INTRAVENOUS ONCE
Status: DISCONTINUED | OUTPATIENT
Start: 2024-03-08 | End: 2024-03-08 | Stop reason: HOSPADM

## 2024-03-08 RX ORDER — BISACODYL 10 MG
10 SUPPOSITORY, RECTAL RECTAL DAILY PRN
Status: DISCONTINUED | OUTPATIENT
Start: 2024-03-09 | End: 2024-03-10 | Stop reason: HOSPADM

## 2024-03-08 RX ORDER — ONDANSETRON 2 MG/ML
4 INJECTION INTRAMUSCULAR; INTRAVENOUS ONCE AS NEEDED
Status: DISCONTINUED | OUTPATIENT
Start: 2024-03-08 | End: 2024-03-08 | Stop reason: HOSPADM

## 2024-03-08 RX ORDER — MORPHINE SULFATE 2 MG/ML
2 INJECTION, SOLUTION INTRAMUSCULAR; INTRAVENOUS
Status: DISCONTINUED | OUTPATIENT
Start: 2024-03-08 | End: 2024-03-08 | Stop reason: HOSPADM

## 2024-03-08 RX ORDER — OXYTOCIN/0.9 % SODIUM CHLORIDE 30/500 ML
125 PLASTIC BAG, INJECTION (ML) INTRAVENOUS ONCE AS NEEDED
Status: DISCONTINUED | OUTPATIENT
Start: 2024-03-08 | End: 2024-03-10 | Stop reason: HOSPADM

## 2024-03-08 RX ORDER — MISOPROSTOL 200 UG/1
800 TABLET ORAL AS NEEDED
Status: DISCONTINUED | OUTPATIENT
Start: 2024-03-08 | End: 2024-03-08 | Stop reason: HOSPADM

## 2024-03-08 RX ORDER — LIDOCAINE HYDROCHLORIDE AND EPINEPHRINE 15; 5 MG/ML; UG/ML
INJECTION, SOLUTION EPIDURAL AS NEEDED
Status: DISCONTINUED | OUTPATIENT
Start: 2024-03-08 | End: 2024-03-08 | Stop reason: SURG

## 2024-03-08 RX ORDER — HYDROCODONE BITARTRATE AND ACETAMINOPHEN 10; 325 MG/1; MG/1
1 TABLET ORAL EVERY 4 HOURS PRN
Status: DISCONTINUED | OUTPATIENT
Start: 2024-03-08 | End: 2024-03-10 | Stop reason: HOSPADM

## 2024-03-08 RX ORDER — METHYLERGONOVINE MALEATE 0.2 MG/ML
200 INJECTION INTRAVENOUS ONCE AS NEEDED
Status: DISCONTINUED | OUTPATIENT
Start: 2024-03-08 | End: 2024-03-08 | Stop reason: HOSPADM

## 2024-03-08 RX ORDER — DOCUSATE SODIUM 100 MG/1
100 CAPSULE, LIQUID FILLED ORAL 2 TIMES DAILY
Status: DISCONTINUED | OUTPATIENT
Start: 2024-03-08 | End: 2024-03-10 | Stop reason: HOSPADM

## 2024-03-08 RX ORDER — IBUPROFEN 600 MG/1
600 TABLET ORAL EVERY 6 HOURS PRN
Status: DISCONTINUED | OUTPATIENT
Start: 2024-03-08 | End: 2024-03-10 | Stop reason: HOSPADM

## 2024-03-08 RX ORDER — EPHEDRINE SULFATE 5 MG/ML
INJECTION INTRAVENOUS
Status: COMPLETED
Start: 2024-03-08 | End: 2024-03-08

## 2024-03-08 RX ORDER — CITRIC ACID/SODIUM CITRATE 334-500MG
30 SOLUTION, ORAL ORAL ONCE
Status: DISCONTINUED | OUTPATIENT
Start: 2024-03-08 | End: 2024-03-08 | Stop reason: HOSPADM

## 2024-03-08 RX ORDER — FENTANYL CITRATE 50 UG/ML
INJECTION, SOLUTION INTRAMUSCULAR; INTRAVENOUS AS NEEDED
Status: DISCONTINUED | OUTPATIENT
Start: 2024-03-08 | End: 2024-03-08 | Stop reason: SURG

## 2024-03-08 RX ORDER — CARBOPROST TROMETHAMINE 250 UG/ML
250 INJECTION, SOLUTION INTRAMUSCULAR AS NEEDED
Status: DISCONTINUED | OUTPATIENT
Start: 2024-03-08 | End: 2024-03-08 | Stop reason: HOSPADM

## 2024-03-08 RX ORDER — BUPIVACAINE HYDROCHLORIDE 2.5 MG/ML
INJECTION, SOLUTION EPIDURAL; INFILTRATION; INTRACAUDAL AS NEEDED
Status: DISCONTINUED | OUTPATIENT
Start: 2024-03-08 | End: 2024-03-08 | Stop reason: SURG

## 2024-03-08 RX ORDER — METOCLOPRAMIDE HYDROCHLORIDE 5 MG/ML
10 INJECTION INTRAMUSCULAR; INTRAVENOUS ONCE AS NEEDED
Status: DISCONTINUED | OUTPATIENT
Start: 2024-03-08 | End: 2024-03-08 | Stop reason: HOSPADM

## 2024-03-08 RX ADMIN — ROPIVACAINE HYDROCHLORIDE 15 ML/HR: 2 INJECTION, SOLUTION EPIDURAL; INFILTRATION at 06:02

## 2024-03-08 RX ADMIN — LABETALOL HYDROCHLORIDE 50 MG: 100 TABLET, FILM COATED ORAL at 21:48

## 2024-03-08 RX ADMIN — LIDOCAINE HYDROCHLORIDE AND EPINEPHRINE 2 ML: 15; 5 INJECTION, SOLUTION EPIDURAL at 05:58

## 2024-03-08 RX ADMIN — LIDOCAINE HYDROCHLORIDE AND EPINEPHRINE 4 ML: 20; 5 INJECTION, SOLUTION EPIDURAL; INFILTRATION; INTRACAUDAL; PERINEURAL at 08:02

## 2024-03-08 RX ADMIN — EPHEDRINE SULFATE 10 MG: 5 INJECTION INTRAVENOUS at 06:56

## 2024-03-08 RX ADMIN — Medication 250 ML/HR: at 10:40

## 2024-03-08 RX ADMIN — EPHEDRINE SULFATE 10 MG: 5 INJECTION INTRAVENOUS at 07:30

## 2024-03-08 RX ADMIN — Medication 2 MILLI-UNITS/MIN: at 00:07

## 2024-03-08 RX ADMIN — ROPIVACAINE HYDROCHLORIDE 6 ML: 5 INJECTION, SOLUTION EPIDURAL; INFILTRATION; PERINEURAL at 06:01

## 2024-03-08 RX ADMIN — DOCUSATE SODIUM 100 MG: 100 CAPSULE, LIQUID FILLED ORAL at 21:47

## 2024-03-08 RX ADMIN — BUPIVACAINE HYDROCHLORIDE 4 ML: 2.5 INJECTION, SOLUTION EPIDURAL; INFILTRATION; INTRACAUDAL; PERINEURAL at 08:33

## 2024-03-08 RX ADMIN — LIDOCAINE HYDROCHLORIDE AND EPINEPHRINE 4 ML: 20; 5 INJECTION, SOLUTION EPIDURAL; INFILTRATION; INTRACAUDAL; PERINEURAL at 08:33

## 2024-03-08 RX ADMIN — FENTANYL CITRATE 100 MCG: 50 INJECTION, SOLUTION INTRAMUSCULAR; INTRAVENOUS at 06:01

## 2024-03-08 RX ADMIN — SODIUM CHLORIDE, POTASSIUM CHLORIDE, SODIUM LACTATE AND CALCIUM CHLORIDE 125 ML/HR: 600; 310; 30; 20 INJECTION, SOLUTION INTRAVENOUS at 06:07

## 2024-03-08 RX ADMIN — SODIUM CHLORIDE, POTASSIUM CHLORIDE, SODIUM LACTATE AND CALCIUM CHLORIDE 125 ML/HR: 600; 310; 30; 20 INJECTION, SOLUTION INTRAVENOUS at 03:14

## 2024-03-08 RX ADMIN — LIDOCAINE HYDROCHLORIDE AND EPINEPHRINE 3 ML: 15; 5 INJECTION, SOLUTION EPIDURAL at 05:55

## 2024-03-08 RX ADMIN — SODIUM CHLORIDE, POTASSIUM CHLORIDE, SODIUM LACTATE AND CALCIUM CHLORIDE 125 ML/HR: 600; 310; 30; 20 INJECTION, SOLUTION INTRAVENOUS at 08:38

## 2024-03-08 RX ADMIN — EPHEDRINE SULFATE 10 MG: 5 INJECTION INTRAVENOUS at 09:34

## 2024-03-08 NOTE — PROGRESS NOTES
Cervix 4/80/-1.  Bag water ruptured clear.  IUPC placed.  Patient has epidural already.  ST category 1 plan to continue Pitocin

## 2024-03-08 NOTE — H&P
ORLANDO Carcamo  Obstetric History and Physical    No chief complaint on file.      Subjective     Patient is a 29 y.o. female  currently at 39w0d, who presents with pregnancy for elective induction at term.  Patient understands the risks and wants to proceed..    Her prenatal care is benign.  Her previous obstetric/gynecological history is noted for is non-contributory.    The following portions of the patients history were reviewed and updated as appropriate: current medications .       Prenatal Information:  Prenatal Results       Initial Prenatal Labs       Test Value Reference Range Date Time    Hemoglobin  11.8 g/dL 12.0 - 15.9 10/09/23 1549       11.9 g/dL 11.1 - 15.9 23 1508       14.3 g/dL 12.0 - 15.9 23 0702    Hematocrit  36.0 % 34.0 - 46.6 10/09/23 1549       36.2 % 34.0 - 46.6 23 1508       42.3 % 34.0 - 46.6 23 0702    Platelets  221 10*3/mm3 140 - 450 10/09/23 1549       282 x10E3/uL 150 - 450 23 1508       301 10*3/mm3 140 - 450 23 0702    Rubella IgG  2.08 index Immune >0.99 23 1508    Hepatitis B SAg  Negative  Negative 23 1508    Hepatitis C Ab  Non Reactive  Non Reactive 23 1508    RPR  Non Reactive  Non Reactive 23 1508    T. Pallidum Ab         ABO  A   24 2247    Rh  Positive   24 2247    Antibody Screen  Negative  Negative 23 1508    HIV  Non Reactive  Non Reactive 23 1508    Urine Culture  Final report   24        Final report   24 1045       Final report   24        Final report   23 1508    Gonorrhea  Negative  Negative 23 1508    Chlamydia  Negative  Negative 23 1508    TSH  1.840 uIU/mL 0.270 - 4.200 21 1447    HgB A1c         Varicella IgG        HgB Electrophoresis         Cystic fibrosis                   Fetal testing        Test Value Reference Range Date Time    NIPT        MSAFP        AFP-4                  2nd and 3rd Trimester       Test Value  Reference Range Date Time    Hemoglobin (repeated)  11.5 g/dL 12.0 - 15.9 03/07/24 2247       10.9 g/dL 12.0 - 15.9 01/03/24 0949       10.9 g/dL 12.0 - 15.9 01/03/24 0949       11.0 g/dL 12.0 - 15.9 12/28/23 1241       11.9 g/dL 11.1 - 15.9 11/28/23 1529    Hematocrit (repeated)  35.0 % 34.0 - 46.6 03/07/24 2247       32.9 % 34.0 - 46.6 01/03/24 0949       32.9 % 34.0 - 46.6 01/03/24 0949       34.4 % 34.0 - 46.6 12/28/23 1241       35.0 % 34.0 - 46.6 11/28/23 1529    Platelets   176 10*3/mm3 140 - 450 03/07/24 2247       191 10*3/mm3 140 - 450 01/03/24 0949       191 10E3/mm3 140 - 450 01/03/24 0949       208 10*3/mm3 140 - 450 12/28/23 1241       227 x10E3/uL 150 - 450 11/28/23 1529       221 10*3/mm3 140 - 450 10/09/23 1549       282 x10E3/uL 150 - 450 07/28/23 1508       301 10*3/mm3 140 - 450 06/16/23 0702    GCT  108 mg/dL 65 - 139 12/28/23 1241    Antibody Screen (repeated)  Negative   03/07/24 2247       Negative  Negative 12/28/23 1241    Third Trimester syphilis screen (repeated)         GTT Fasting        GTT 1 Hr        GTT 2 Hr        GTT 3 Hr        Group B Strep  No Group B Streptococcus Isolated at 2 days   02/15/24 1748              Other testing        Test Value Reference Range Date Time    [place] IgG         CMV IgG                   Drug Screening       Test Value Reference Range Date Time    Amphetamine Screen  Negative ng/mL Rxhgkv=5247 07/28/23 1508    Barbiturate Screen  Negative ng/mL Yyezzy=228 07/28/23 1508    Benzodiazepine Screen  Negative ng/mL Ostcve=017 07/28/23 1508    Methadone Screen  Negative ng/mL Iylbsl=442 07/28/23 1508    Phencyclidine Screen  Negative ng/mL Cutoff=25 07/28/23 1508    Opiates Screen  Negative ng/mL Sbfaxm=418 07/28/23 1508    THC Screen  Negative ng/mL Cutoff=20 07/28/23 1508    Cocaine Screen  Negative ng/mL Dqeeva=437 07/28/23 1508    Propoxyphene Screen  Negative ng/mL Kufrka=041 07/28/23 1508    Buprenorphine Screen        Methamphetamine Screen         Oxycodone Screen        Tricyclic Antidepressants Screen                  Legend    ^: Historical                          External Prenatal Results       Pregnancy Outside Results - Transcribed From Office Records - See Scanned Records For Details       Test Value Date Time    ABO  A  03/07/24 2247    Rh  Positive  03/07/24 2247    Antibody Screen  Negative  03/07/24 2247       Negative  12/28/23 1241       Negative  07/28/23 1508    Varicella IgG       Rubella  2.08 index 07/28/23 1508    Hgb  11.5 g/dL 03/07/24 2247       10.9 g/dL 01/03/24 0949       10.9 g/dL 01/03/24 0949       11.0 g/dL 12/28/23 1241       11.9 g/dL 11/28/23 1529       11.8 g/dL 10/09/23 1549       11.9 g/dL 07/28/23 1508       14.3 g/dL 06/16/23 0702    Hct  35.0 % 03/07/24 2247       32.9 % 01/03/24 0949       32.9 % 01/03/24 0949       34.4 % 12/28/23 1241       35.0 % 11/28/23 1529       36.0 % 10/09/23 1549       36.2 % 07/28/23 1508       42.3 % 06/16/23 0702    Glucose Fasting GTT       Glucose Tolerance Test 1 hour       Glucose Tolerance Test 3 hour       Gonorrhea (discrete)  Negative  07/28/23 1508    Chlamydia (discrete)  Negative  07/28/23 1508    RPR  Non Reactive  07/28/23 1508    VDRL       Syphilis Antibody       HBsAg  Negative  07/28/23 1508    Herpes Simplex Virus PCR       Herpes Simplex VIrus Culture       HIV  Non Reactive  07/28/23 1508    Hep C RNA Quant PCR       Hep C Antibody  Non Reactive  07/28/23 1508    AFP       Group B Strep  No Group B Streptococcus Isolated at 2 days  02/15/24 1748    GBS Susceptibility to Clindamycin       GBS Susceptibility to Erythromycin       Fetal Fibronectin       Genetic Testing, Maternal Blood                 Drug Screening       Test Value Date Time    Urine Drug Screen       Amphetamine Screen  Negative ng/mL 07/28/23 1508    Barbiturate Screen  Negative ng/mL 07/28/23 1508    Benzodiazepine Screen  Negative ng/mL 07/28/23 1508    Methadone Screen  Negative ng/mL 07/28/23 1508     "Phencyclidine Screen  Negative ng/mL 23 1508    Opiates Screen       THC Screen       Cocaine Screen       Propoxyphene Screen  Negative ng/mL 23 1508    Buprenorphine Screen       Methamphetamine Screen       Oxycodone Screen       Tricyclic Antidepressants Screen                 Legend    ^: Historical                             Past OB History:     OB History    Para Term  AB Living   6 3 3 0 2 3   SAB IAB Ectopic Molar Multiple Live Births   1 0 1 0 0 3      # Outcome Date GA Lbr Adalberto/2nd Weight Sex Type Anes PTL Lv   6 Current            5 Term 22 38w0d / 00:21 3210 g (7 lb 1.2 oz) F Vag-Spont EPI N CHUCK      Name: CHANDLER SPARKS      Apgar1: 7  Apgar5: 9   4 SAB 2021 7w0d             Birth Comments: no complications   3 Ectopic 2021              Birth Comments: methotrexate   2 Term 13 38w0d  3175 g (7 lb) M Vag-Spont  N CHUCK      Birth Comments: uncomplicated pregnancy and delivery   1 Term 11 38w0d  2920 g (6 lb 7 oz) F Vag-Spont  N CHUCK      Birth Comments: uncomplicated pregnancy and delivery      Obstetric Comments   FOB#1-G1,2   FOB#2-G3,4,5,6      G4: pregnancy complicated by CHTN treated with labetalol and placental shelf             Past Medical History: Past Medical History:   Diagnosis Date    Acid reflux     Anxiety     Depression     Ectopic pregnancy 2021    treated with methotrexate    Endometrial polyp     severe bleeding and polyps-removed and \"cleaned up\" 3/2021    Gum disease         Herpes     Oral    History of mastitis 2013    Rt breast, required I&D, antibiotics, and packing    Hypertension 2020    Initially started on lisinopril 20 mg    Migraine 2017    hasnt seen an MD for migraine    Recurrent pregnancy loss, antepartum condition or complication 2021    Ectopic 2021, SAB 2021    Varicella 1997      Past Surgical History Past Surgical History:   Procedure Laterality Date    D & C HYSTEROSCOPY  " 03/26/2021    with polypectomy    DILATATION AND CURETTAGE  03/26/2021 2021    ENDOSCOPY  05/2021    GERD    HYSTEROSCOPY  03/26/2021    TOOTH EXTRACTION  07/28/2023    WISDOM TOOTH EXTRACTION        Family History: Family History   Problem Relation Age of Onset    Hypertension Father     Diabetes Other     Hypertension Other     Breast cancer Neg Hx     Ovarian cancer Neg Hx     Uterine cancer Neg Hx     Colon cancer Neg Hx       Social History:  reports that she has never smoked. She has never been exposed to tobacco smoke. She has never used smokeless tobacco.   reports that she does not currently use alcohol.   reports no history of drug use.        General ROS: Pertinent items are noted in HPI    Objective       Vital Signs Range for the last 24 hours  Temperature: Temp:  [98.1 °F (36.7 °C)] 98.1 °F (36.7 °C)   Temp Source: Temp src: Oral   BP: BP: (131-166)/(78-96) 138/82   Pulse: Heart Rate:  [81-89] 87   Respirations: Resp:  [16] 16   SPO2:     O2 Amount (l/min):     O2 Devices     Weight: Weight:  [93.4 kg (206 lb)] 93.4 kg (206 lb)     Physical Examination: General appearance - alert, well appearing, and in no distress    Presentation: Vertex   Cervix: Exam by:     Dilation:     Effacement:     Station:         Fetal Heart Rate Assessment   Method:     Beats/min:     Baseline:     Varibility:     Accels:     Decels:     Tracing Category:       Uterine Assessment   Method:     Frequency (min):     Ctx Count in 10 min:     Duration:     Intensity:     Intensity by IUPC:     Resting Tone:     Resting Tone by IUPC:     [place] Units:       Laboratory Results:   Radiology Review:   Other Studies:     Assessment & Plan       Encounter for induction of labor    Prenatal care, subsequent pregnancy, third trimester        Assessment:  1.  Intrauterine pregnancy at 39w0d weeks gestation with reactive fetal status.    2.  Desires elective induction.  3.  Obstetrical history significant for is  "non-contributory.  4.  GBS status: No results found for: \"GBSANTIGEN\"    Plan:  1.  Pitocin induction of delivery.  2. Plan of care has been reviewed with patient and family  3.  Risks, benefits of treatment plan have been discussed.  4.  All questions have been answered.  5.        Donte Brunner MD  3/8/2024  06:39 EST  "

## 2024-03-08 NOTE — L&D DELIVERY NOTE
" [facility]   Vaginal Delivery Note    Patient Name: Malena Stewart  : 1995  MRN: [id number]    Date of Delivery: 3/8/2024     Diagnosis     Pre & Post-Delivery:  Intrauterine pregnancy at 39w0d  Labor status: Induced Onset of Labor     Encounter for induction of labor    Prenatal care, subsequent pregnancy, third trimester             Problem List    Transfer to Postpartum     Review the Delivery Report for details.     Delivery     Delivery: Vaginal, Spontaneous     YOB: 2026    Time of Birth:  Gestational Age 10:01 AM   39w0d     Anesthesia: Epidural     Delivering clinician: Donte Brunner    Forceps?   No   Vacuum? No    Shoulder dystocia present: No        Delivery narrative: Patient pushed for 5 minutes and the vertex delivered vaginally over no episiotomy under epidural a male.  Baby cried on perineum but had a tight nuchal cord that had to be cut there.  Once baby delivered suction handed off to the nurses he was crying.  Cord blood obtained and placenta expressed intact.  EBL was 250 there were no lacerations and no stitches needed.        Infant     Findings: male  infant     Infant observations: Weight: 3710 g (8 lb 2.9 oz)   Length: 20.5  in  Observations/Comments:        Apgars:   @ 1 minute /      @ 5 minutes   Infant Name:      Placenta & Cord         Placenta delivered  Spontaneous  at   3/8/2024 10:05 AM     Cord: 3 vessels  present.   Nuchal Cord?  yes; Number of nuchal loops present:  1    Cord blood obtained: Yes    Cord gases obtained:  No    Cord gas results: Venous:  No results found for: \"PHCVEN\", \"BECVEN\"    Arterial:  No results found for: \"PHCART\", \"BECART\"     Repair     Episiotomy: None     No    Lacerations: No   Estimated Blood Loss:       Quantitative Blood Loss:          Complications     hypertension    Disposition     Mother to Mother Baby/Postpartum  in stable condition currently.  Baby to NBN  in stable condition currently.    Donte Brunner" MD  03/08/24  10:24 EST

## 2024-03-08 NOTE — PAYOR COMM NOTE
"AngelSusan urena (29 y.o. Female)     [employer] ID#[id number]    Delivery Information:  Vaginal Delivery 3/8/24 @ 10:01  Wt 3710 gms  Male  Apgars 8/9    From:Lisa Issa LPN, Utilization Review  Phone #[phone]  Fax #[phone]        Date of Birth   1995    Social Security Number       Address   [address] [facility] [address]    Home Phone   [phone]    MRN   [id number]       Quaker   None    Marital Status                               Admission Date   3/7/24    Admission Type   Elective    Admitting Provider   Donte Brunner MD    Attending Provider   Donte Brunner MD    Department, Room/Bed   [facility] 4B, N427/1       Discharge Date       Discharge Disposition       Discharge Destination                                 Attending Provider: Donte Brunner MD    Allergies: Amoxicillin    Isolation: None   Infection: None   Code Status: CPR    Ht: 160 cm (63\")   Wt: 93.4 kg (206 lb)    Admission Cmt: None   Principal Problem: Prenatal care, subsequent pregnancy, third trimester [Z34.83]                   Active Insurance as of 3/7/2024       Primary Coverage       Payor Plan Insurance Group Employer/Plan Group    [employer] [place] [employer] [place]        Payor Plan Address Payor Plan Phone Number Payor Plan Fax Number Effective Dates    [address] [address] [phone]  1/16/2024 - None Entered    [facility] [id number]         Subscriber Name Subscriber Birth Date Member ID       DAHIANAEZEKIELRODRIGO AYALA 1995 [id number]                     Emergency Contacts        (Rel.) Home Phone Work Phone Mobile Phone    Alexey Stewart (Spouse) [phone] -- [phone]              Insurance Information                  [facility] MEDICAID Phone: [phone]    Subscriber: Susan Stewart Subscriber#: [id number]    Group#: -- Precert#: --             History & Physical        Donte Brunner MD at 03/08/24 0638        "   ORLANDO Carcamo  Obstetric History and Physical    No chief complaint on file.      Subjective    Patient is a 29 y.o. female  currently at 39w0d, who presents with pregnancy for elective induction at term.  Patient understands the risks and wants to proceed..    Her prenatal care is benign.  Her previous obstetric/gynecological history is noted for is non-contributory.    The following portions of the patients history were reviewed and updated as appropriate: current medications .       Prenatal Information:  Prenatal Results       Initial Prenatal Labs       Test Value Reference Range Date Time    Hemoglobin  11.8 g/dL 12.0 - 15.9 10/09/23 1549       11.9 g/dL 11.1 - 15.9 23 1508       14.3 g/dL 12.0 - 15.9 23 0702    Hematocrit  36.0 % 34.0 - 46.6 10/09/23 1549       36.2 % 34.0 - 46.6 23 1508       42.3 % 34.0 - 46.6 23 0702    Platelets  221 10*3/mm3 140 - 450 10/09/23 1549       282 x10E3/uL 150 - 450 23 1508       301 10*3/mm3 140 - 450 23 0702    Rubella IgG  2.08 index Immune >0.99 23 1508    Hepatitis B SAg  Negative  Negative 23 1508    Hepatitis C Ab  Non Reactive  Non Reactive 23 1508    RPR  Non Reactive  Non Reactive 23 1508    T. Pallidum Ab         ABO  A   24 2247    Rh  Positive   24 2247    Antibody Screen  Negative  Negative 23 1508    HIV  Non Reactive  Non Reactive 23 1508    Urine Culture  Final report   24        Final report   24 1045       Final report   24        Final report   23 1508    Gonorrhea  Negative  Negative 23 1508    Chlamydia  Negative  Negative 23 1508    TSH  1.840 uIU/mL 0.270 - 4.200 21 1447    HgB A1c         Varicella IgG        HgB Electrophoresis         Cystic fibrosis                   Fetal testing        Test Value Reference Range Date Time    NIPT        MSAFP        AFP-4                  2nd and 3rd Trimester       Test Value  Reference Range Date Time    Hemoglobin (repeated)  11.5 g/dL 12.0 - 15.9 03/07/24 2247       10.9 g/dL 12.0 - 15.9 01/03/24 0949       10.9 g/dL 12.0 - 15.9 01/03/24 0949       11.0 g/dL 12.0 - 15.9 12/28/23 1241       11.9 g/dL 11.1 - 15.9 11/28/23 1529    Hematocrit (repeated)  35.0 % 34.0 - 46.6 03/07/24 2247       32.9 % 34.0 - 46.6 01/03/24 0949       32.9 % 34.0 - 46.6 01/03/24 0949       34.4 % 34.0 - 46.6 12/28/23 1241       35.0 % 34.0 - 46.6 11/28/23 1529    Platelets   176 10*3/mm3 140 - 450 03/07/24 2247       191 10*3/mm3 140 - 450 01/03/24 0949       191 10E3/mm3 140 - 450 01/03/24 0949       208 10*3/mm3 140 - 450 12/28/23 1241       227 x10E3/uL 150 - 450 11/28/23 1529       221 10*3/mm3 140 - 450 10/09/23 1549       282 x10E3/uL 150 - 450 07/28/23 1508       301 10*3/mm3 140 - 450 06/16/23 0702    GCT  108 mg/dL 65 - 139 12/28/23 1241    Antibody Screen (repeated)  Negative   03/07/24 2247       Negative  Negative 12/28/23 1241    Third Trimester syphilis screen (repeated)         GTT Fasting        GTT 1 Hr        GTT 2 Hr        GTT 3 Hr        Group B Strep  No Group B Streptococcus Isolated at 2 days   02/15/24 1748              Other testing        Test Value Reference Range Date Time    Parvo IgG         CMV IgG                   Drug Screening       Test Value Reference Range Date Time    Amphetamine Screen  Negative ng/mL Rzegjt=2265 07/28/23 1508    Barbiturate Screen  Negative ng/mL Okqvcs=690 07/28/23 1508    Benzodiazepine Screen  Negative ng/mL Avsnci=621 07/28/23 1508    Methadone Screen  Negative ng/mL Wynqeu=912 07/28/23 1508    Phencyclidine Screen  Negative ng/mL Cutoff=25 07/28/23 1508    Opiates Screen  Negative ng/mL Imfdok=658 07/28/23 1508    THC Screen  Negative ng/mL Cutoff=20 07/28/23 1508    Cocaine Screen  Negative ng/mL Zkqwim=141 07/28/23 1508    Propoxyphene Screen  Negative ng/mL Pvstla=445 07/28/23 1508    Buprenorphine Screen        Methamphetamine Screen         Oxycodone Screen        Tricyclic Antidepressants Screen                  Legend    ^: Historical                          External Prenatal Results       Pregnancy Outside Results - Transcribed From Office Records - See Scanned Records For Details       Test Value Date Time    ABO  A  03/07/24 2247    Rh  Positive  03/07/24 2247    Antibody Screen  Negative  03/07/24 2247       Negative  12/28/23 1241       Negative  07/28/23 1508    Varicella IgG       Rubella  2.08 index 07/28/23 1508    Hgb  11.5 g/dL 03/07/24 2247       10.9 g/dL 01/03/24 0949       10.9 g/dL 01/03/24 0949       11.0 g/dL 12/28/23 1241       11.9 g/dL 11/28/23 1529       11.8 g/dL 10/09/23 1549       11.9 g/dL 07/28/23 1508       14.3 g/dL 06/16/23 0702    Hct  35.0 % 03/07/24 2247       32.9 % 01/03/24 0949       32.9 % 01/03/24 0949       34.4 % 12/28/23 1241       35.0 % 11/28/23 1529       36.0 % 10/09/23 1549       36.2 % 07/28/23 1508       42.3 % 06/16/23 0702    Glucose Fasting GTT       Glucose Tolerance Test 1 hour       Glucose Tolerance Test 3 hour       Gonorrhea (discrete)  Negative  07/28/23 1508    Chlamydia (discrete)  Negative  07/28/23 1508    RPR  Non Reactive  07/28/23 1508    VDRL       Syphilis Antibody       HBsAg  Negative  07/28/23 1508    Herpes Simplex Virus PCR       Herpes Simplex VIrus Culture       HIV  Non Reactive  07/28/23 1508    Hep C RNA Quant PCR       Hep C Antibody  Non Reactive  07/28/23 1508    AFP       Group B Strep  No Group B Streptococcus Isolated at 2 days  02/15/24 1748    GBS Susceptibility to Clindamycin       GBS Susceptibility to Erythromycin       Fetal Fibronectin       Genetic Testing, Maternal Blood                 Drug Screening       Test Value Date Time    Urine Drug Screen       Amphetamine Screen  Negative ng/mL 07/28/23 1508    Barbiturate Screen  Negative ng/mL 07/28/23 1508    Benzodiazepine Screen  Negative ng/mL 07/28/23 1508    Methadone Screen  Negative ng/mL 07/28/23 1508     "Phencyclidine Screen  Negative ng/mL 23 1508    Opiates Screen       THC Screen       Cocaine Screen       Propoxyphene Screen  Negative ng/mL 23 1508    Buprenorphine Screen       Methamphetamine Screen       Oxycodone Screen       Tricyclic Antidepressants Screen                 Legend    ^: Historical                             Past OB History:     OB History    Para Term  AB Living   6 3 3 0 2 3   SAB IAB Ectopic Molar Multiple Live Births   1 0 1 0 0 3      # Outcome Date GA Lbr Adalberto/2nd Weight Sex Type Anes PTL Lv   6 Current            5 Term 22 38w0d / 00:21 3210 g (7 lb 1.2 oz) F Vag-Spont EPI N CHUCK      Name: CHANDLER SPARKS      Apgar1: 7  Apgar5: 9   4 SAB 2021 7w0d             Birth Comments: no complications   3 Ectopic 2021              Birth Comments: methotrexate   2 Term 13 38w0d  3175 g (7 lb) M Vag-Spont  N CHUCK      Birth Comments: uncomplicated pregnancy and delivery   1 Term 11 38w0d  2920 g (6 lb 7 oz) F Vag-Spont  N CHUCK      Birth Comments: uncomplicated pregnancy and delivery      Obstetric Comments   FOB#1-G1,2   FOB#2-G3,4,5,6      G4: pregnancy complicated by CHTN treated with labetalol and placental shelf             Past Medical History: Past Medical History:   Diagnosis Date    Acid reflux     Anxiety     Depression     Ectopic pregnancy 2021    treated with methotrexate    Endometrial polyp     severe bleeding and polyps-removed and \"cleaned up\" 3/2021    Gum disease         Herpes     Oral    History of mastitis 2013    Rt breast, required I&D, antibiotics, and packing    Hypertension 2020    Initially started on lisinopril 20 mg    Migraine 2017    hasnt seen an MD for migraine    Recurrent pregnancy loss, antepartum condition or complication 2021    Ectopic 2021, SAB 2021    Varicella 1997      Past Surgical History Past Surgical History:   Procedure Laterality Date    D & C HYSTEROSCOPY  " 03/26/2021    with polypectomy    DILATATION AND CURETTAGE  03/26/2021 2021    ENDOSCOPY  05/2021    GERD    HYSTEROSCOPY  03/26/2021    TOOTH EXTRACTION  07/28/2023    WISDOM TOOTH EXTRACTION        Family History: Family History   Problem Relation Age of Onset    Hypertension Father     Diabetes Other     Hypertension Other     Breast cancer Neg Hx     Ovarian cancer Neg Hx     Uterine cancer Neg Hx     Colon cancer Neg Hx       Social History:  reports that she has never smoked. She has never been exposed to tobacco smoke. She has never used smokeless tobacco.   reports that she does not currently use alcohol.   reports no history of drug use.        General ROS: Pertinent items are noted in HPI    Objective      Vital Signs Range for the last 24 hours  Temperature: Temp:  [98.1 °F (36.7 °C)] 98.1 °F (36.7 °C)   Temp Source: Temp src: Oral   BP: BP: (131-166)/(78-96) 138/82   Pulse: Heart Rate:  [81-89] 87   Respirations: Resp:  [16] 16   SPO2:     O2 Amount (l/min):     O2 Devices     Weight: Weight:  [93.4 kg (206 lb)] 93.4 kg (206 lb)     Physical Examination: General appearance - alert, well appearing, and in no distress    Presentation: Vertex   Cervix: Exam by:     Dilation:     Effacement:     Station:         Fetal Heart Rate Assessment   Method:     Beats/min:     Baseline:     Varibility:     Accels:     Decels:     Tracing Category:       Uterine Assessment   Method:     Frequency (min):     Ctx Count in 10 min:     Duration:     Intensity:     Intensity by IUPC:     Resting Tone:     Resting Tone by IUPC:     [place] Units:       Laboratory Results:   Radiology Review:   Other Studies:     Assessment & Plan      Encounter for induction of labor    Prenatal care, subsequent pregnancy, third trimester        Assessment:  1.  Intrauterine pregnancy at 39w0d weeks gestation with reactive fetal status.    2.  Desires elective induction.  3.  Obstetrical history significant for is  "non-contributory.  4.  GBS status: No results found for: \"GBSANTIGEN\"    Plan:  1.  Pitocin induction of delivery.  2. Plan of care has been reviewed with patient and family  3.  Risks, benefits of treatment plan have been discussed.  4.  All questions have been answered.  5.        Donte Brunner MD  3/8/2024  06:39 EST    Electronically signed by Donte Brunner MD at 03/08/24 0639       Facility-Administered Medications as of 3/8/2024   Medication Dose Route Frequency Provider Last Rate Last Admin    acetaminophen (TYLENOL) tablet 650 mg  650 mg Oral Q6H PRN Donte Brunner MD        benzocaine (AMERICAINE) 20 % rectal ointment 1 Application  1 Application Rectal PRN Donte Brunner MD        benzocaine-menthol (DERMOPLAST) 20-0.5 % topical spray   Topical PRN Donte Brunner MD        [START ON 3/9/2024] bisacodyl (DULCOLAX) suppository 10 mg  10 mg Rectal Daily PRN Donte Brunner MD        docusate sodium (COLACE) capsule 100 mg  100 mg Oral BID Donte Brunner MD        HYDROcodone-acetaminophen (NORCO) 5-325 MG per tablet 1 tablet  1 tablet Oral Q4H PRN Donte Brunner MD        Or    HYDROcodone-acetaminophen (NORCO)  MG per tablet 1 tablet  1 tablet Oral Q4H PRN Donte Brunner MD        Hydrocortisone (Perianal) (ANUSOL-HC) 2.5 % rectal cream 1 Application  1 Application Rectal PRN Donte Brunner MD        ibuprofen (ADVIL,MOTRIN) tablet 600 mg  600 mg Oral Q6H PRN Donte Brunner MD        labetalol (NORMODYNE) tablet 50 mg  50 mg Oral BID Donte Brunner MD        [COMPLETED] lactated ringers bolus 1,000 mL  1,000 mL Intravenous Once PRN Donte Brunner MD 2,000 mL/hr at 03/07/24 2247 1,000 mL at 03/07/24 2247    lanolin topical 1 Application  1 Application Topical Q1H PRN Donte Brunner MD        magnesium hydroxide (MILK OF MAGNESIA) 400 MG/5ML suspension 10 mL  10 mL Oral Daily PRN Donte Brunner MD        Measles, Mumps & Rubella Vac (MMR) injection 0.5 mL  0.5 mL Subcutaneous During " Hospitalization Donte Brunner MD        [] oxytocin (PITOCIN) 30 units in 0.9% sodium chloride 500 mL (premix)  250 mL/hr Intravenous Continuous Donte Brunner MD        oxytocin (PITOCIN) 30 units in 0.9% sodium chloride 500 mL (premix)  125 mL/hr Intravenous Once PRN Donte Brunner MD        promethazine (PHENERGAN) tablet 25 mg  25 mg Oral Q6H PRN Donte Brunner MD        witch hazel-glycerin (TUCKS) pad 1 Pad  1 each Topical PRN Donte Brunner MD         Orders (last 24 hrs)        Start     Ordered    24 0800  Sitz Bath  3 Times Daily        Comments: PRN    24 1247    24 0600  CBC & Differential  Timed        Comments: Postpartum Day 1      24 1247    24 0600  Hemoglobin & Hematocrit, Blood  Timed        Comments: Postpartum Day 1      24 1247    24 0000  bisacodyl (DULCOLAX) suppository 10 mg  Daily PRN         24 1247    24 2100  labetalol (NORMODYNE) tablet 50 mg  2 Times Daily         24 1247    24 2100  docusate sodium (COLACE) capsule 100 mg  2 Times Daily         24 1247    24 1248  Transfer Patient  Once         24 1247    24 1248  Admit To Obstetrics Inpatient  Once         24 1247    24 1248  Code Status and Medical Interventions:  Continuous         24 1247    24 1248  Vital Signs Per hospital policy  Per Hospital Policy         24 1247    24 1248  Notify Physician  Until Discontinued         24 1247    24 1248  Up Ad Ava  Until Discontinued         24 1247    24 1248  Ambulate Patient  Every Shift       24 1247    24 1248  Patient May Shower  Once         24 1247    24 1248  Advance Diet As Tolerated -Regular  Until Discontinued         24 1247    24 1248  Fundal and Lochia Check  Per Hospital Policy        Comments: Q 15 min x 4, Q 30 min x 2, then Q Shift    24 1247    24 1248  RN to  "Assess Rh Status & Place RhIG Evaluation Order if Indicated  Continuous         03/08/24 1247    03/08/24 1248  Bladder Assessment  Per Order Details        Comments: Postpartum 1) Upon Admission to Unit & Every 4 Hours PRN Until Voiding. 2) Out of Bed to Void in 8 Hours.    03/08/24 1247    03/08/24 1248  Straight Cath  Per Order Details        Comments: Postpartum: If Distended & Unable to Void, May Repeat Once.    03/08/24 1247    03/08/24 1248  Indwelling Urinary Catheter  Per Order Details        Comments: Postpartum : After Straight Cathed x2 or if Greater Than 1000mL Residual, Insert Indwelling Urinary Catheter Until Further MD Order.    03/08/24 1247    03/08/24 1248  Breast pump to bed  Once         03/08/24 1247    03/08/24 1248  If indicated -- Please administer RH Immunoglobulin based on results of cord blood evaluation and fetal screen lab tests, pharmacy to dispense  Per Order Details        Comments: See Process Instructions For Reference Range Details.    03/08/24 1247    03/08/24 1247  oxytocin (PITOCIN) 30 units in 0.9% sodium chloride 500 mL (premix)  Once As Needed         03/08/24 1247    03/08/24 1247  ibuprofen (ADVIL,MOTRIN) tablet 600 mg  Every 6 Hours PRN         03/08/24 1247    03/08/24 1247  acetaminophen (TYLENOL) tablet 650 mg  Every 6 Hours PRN         03/08/24 1247    03/08/24 1247  HYDROcodone-acetaminophen (NORCO) 5-325 MG per tablet 1 tablet  Every 4 Hours PRN        Placed in \"Or\" Linked Group    03/08/24 1247    03/08/24 1247  HYDROcodone-acetaminophen (NORCO)  MG per tablet 1 tablet  Every 4 Hours PRN        Placed in \"Or\" Linked Group    03/08/24 1247    03/08/24 1247  magnesium hydroxide (MILK OF MAGNESIA) 400 MG/5ML suspension 10 mL  Daily PRN         03/08/24 1247    03/08/24 1247  lanolin topical 1 Application  Every 1 Hour PRN         03/08/24 1247    03/08/24 1247  benzocaine-menthol (DERMOPLAST) 20-0.5 % topical spray  As Needed         03/08/24 1247    03/08/24 " "1247  witch hazel-glycerin (TUCKS) pad 1 Pad  As Needed         03/08/24 1247    03/08/24 1247  Hydrocortisone (Perianal) (ANUSOL-HC) 2.5 % rectal cream 1 Application  As Needed         03/08/24 1247    03/08/24 1247  benzocaine (AMERICAINE) 20 % rectal ointment 1 Application  As Needed         03/08/24 1247    03/08/24 1247  promethazine (PHENERGAN) tablet 25 mg  Every 6 Hours PRN         03/08/24 1247    03/08/24 1247  Measles, Mumps & Rubella Vac (MMR) injection 0.5 mL  During Hospitalization         03/08/24 1247    03/08/24 1130  oxytocin (PITOCIN) 30 units in 0.9% sodium chloride 500 mL (premix)  Continuous        Placed in \"Followed by\" Linked Group    03/08/24 1020    03/08/24 1115  oxytocin (PITOCIN) 30 units in 0.9% sodium chloride 500 mL (premix)  Once,   Status:  Discontinued        Placed in \"Followed by\" Linked Group    03/08/24 1020    03/08/24 1029  VTE Prophylaxis Not Indicated: No Risk Factors (0); </= 3 (Low Risk)  Once         03/08/24 1029    03/08/24 1021  Notify Physician (specified)  Until Discontinued,   Status:  Canceled         03/08/24 1020    03/08/24 1021  Vital Signs Per hospital policy  Per Hospital Policy,   Status:  Canceled         03/08/24 1020    03/08/24 1021  Fundal & Lochia Check  Per Order Details,   Status:  Canceled        Comments: Every 15 Minutes x4, Then Every 30 Minutes x2, Then Every Shift    03/08/24 1020    03/08/24 1021  Diet: Regular/House Diet; Texture: Regular Texture (IDDSI 7); Fluid Consistency: Thin (IDDSI 0)  Diet Effective Now         03/08/24 1020    03/08/24 1020  Up with Assistance  As Needed,   Status:  Canceled       03/08/24 1020    03/08/24 1020  Fundal & Lochia Check  Every Shift,   Status:  Canceled       03/08/24 1020    03/08/24 1020  Apply Ice to Perineum  As Needed,   Status:  Canceled       03/08/24 1020    03/08/24 1020  Bladder Assessment  As Needed,   Status:  Canceled       03/08/24 1020    03/08/24 1020  acetaminophen (TYLENOL) tablet 650 " "mg  Every 4 Hours PRN,   Status:  Discontinued         03/08/24 1020    03/08/24 1020  morphine injection 2 mg  Every 2 Hours PRN,   Status:  Discontinued         03/08/24 1020    03/08/24 1020  oxyCODONE-acetaminophen (PERCOCET) 7.5-325 MG per tablet 2 tablet  Every 4 Hours PRN,   Status:  Discontinued         03/08/24 1020    03/08/24 1020  morphine injection 2 mg  Every 2 Hours PRN,   Status:  Discontinued         03/08/24 1020    03/08/24 1020  methylergonovine (METHERGINE) injection 200 mcg  Once As Needed,   Status:  Discontinued         03/08/24 1020    03/08/24 1020  carboprost (HEMABATE) injection 250 mcg  As Needed,   Status:  Discontinued         03/08/24 1020    03/08/24 1020  miSOPROStol (CYTOTEC) tablet 800 mcg  As Needed,   Status:  Discontinued         03/08/24 1020    03/08/24 1020  promethazine (PHENERGAN) suppository 12.5 mg  Every 6 Hours PRN,   Status:  Discontinued        Placed in \"Or\" Linked Group    03/08/24 1020    03/08/24 1020  promethazine (PHENERGAN) tablet 12.5 mg  Every 6 Hours PRN,   Status:  Discontinued        Placed in \"Or\" Linked Group    03/08/24 1020    03/08/24 0630  oxytocin (PITOCIN) 30 units in 0.9% sodium chloride 500 mL (premix)  Titrated,   Status:  Discontinued         03/07/24 2233 03/08/24 0630  ropivacaine (NAROPIN) 0.2 % injection  Continuous,   Status:  Discontinued         03/08/24 0544    03/08/24 0630  Sod Citrate-Citric Acid (BICITRA) oral solution 30 mL  Once,   Status:  Discontinued         03/08/24 0544    03/08/24 0558  ! Epidural  Continuous PRN,   Status:  Discontinued         03/08/24 0558    03/08/24 0545  Vital Signs Per Anesthesia Guidelines  Per Order Details,   Status:  Canceled        Comments: Every 3 Minutes x20 Minutes Following Epidural Dosing, Then Every 15 Minutes If Stable    03/08/24 0544    03/08/24 0545  Start IV with #16 or #18 gauge angiocath.  Once,   Status:  Canceled         03/08/24 0544    03/08/24 0545  Nurse or " anesthesiologist to remain with patient for 15 minutes following dosing.  Until Discontinued,   Status:  Canceled         03/08/24 0544    03/08/24 0545  Facilitate maternal postion on side and maintain uterine displacement.  Until Discontinued,   Status:  Canceled         03/08/24 0544    03/08/24 0545  Consult anesthesia services prior to changing epidural infusion/rate.  Until Discontinued,   Status:  Canceled         03/08/24 0544    03/08/24 0544  lactated ringers bolus 1,000 mL  As Needed,   Status:  Discontinued         03/08/24 0544    03/08/24 0544  ePHEDrine Sulfate (Pressors) 5 MG/ML injection 10 mg  Every 10 Minutes PRN,   Status:  Discontinued         03/08/24 0544    03/08/24 0544  metoclopramide (REGLAN) injection 10 mg  Once As Needed,   Status:  Discontinued         03/08/24 0544    03/08/24 0544  ondansetron (ZOFRAN) injection 4 mg  Once As Needed,   Status:  Discontinued         03/08/24 0544    03/08/24 0544  famotidine (PEPCID) injection 20 mg  Once As Needed,   Status:  Discontinued         03/08/24 0544    03/08/24 0544  diphenhydrAMINE (BENADRYL) injection 12.5 mg  Every 8 Hours PRN,   Status:  Discontinued         03/08/24 0544    03/08/24 0500  oxytocin (PITOCIN) 30 units in 0.9% sodium chloride 500 mL (premix)  Titrated,   Status:  Discontinued         03/07/24 2233 03/08/24 0015  oxytocin (PITOCIN) 30 units in 0.9% sodium chloride 500 mL (premix)  Titrated,   Status:  Discontinued         03/07/24 2319    03/08/24 0000  Vital Signs q 4 while awake  Every 4 Hours,   Status:  Canceled      Comments: While the patient is awake.    03/07/24 2233 03/08/24 0000  Hicks Bulb Cervical Ripening  Once,   Status:  Canceled        Comments: Per laborist    03/07/24 2233 03/07/24 2330  sodium chloride 0.9 % flush 10 mL  Every 12 Hours Scheduled,   Status:  Discontinued         03/07/24 2233 03/07/24 2330  lactated ringers infusion  Continuous,   Status:  Discontinued         03/07/24 2233     24 233  mineral oil liquid 30 mL  Once,   Status:  Discontinued         24 223  Preeclampsia Panel  Once         24  Code Status and Medical Interventions:  Continuous,   Status:  Canceled         24  Vital Signs Per hospital policy  Per Hospital Policy,   Status:  Canceled         24  Mini- prep prior to delivery  Once,   Status:  Canceled         24  Continuous Fetal Monitoring With NST on Admission and Prior to Initiation of Oxytocin.  Per Order Details,   Status:  Canceled        Comments: Continuous Fetal Monitoring With NST on Admission & Prior to Initiation of Oxytocin.    24  External Uterine Contraction Monitoring  Per Hospital Policy,   Status:  Canceled         24  Notify Physician (specified)  Until Discontinued,   Status:  Canceled         24  Notify physician for hyperstimulus (per hospital algorithm)  Until Discontinued,   Status:  Canceled         24  Notify physician if membranes ruptured, bleeding greater than 1 pad an hour, fetal heart tone abnormality, and severe pain  Until Discontinued,   Status:  Canceled         24  Bed Rest with Bathroom Privileges  Once,   Status:  Canceled         24  Cervical Exam  Once,   Status:  Canceled        Comments: Unless contraindicated, every 1-2 hours in active labor, or at nurse's discretion.    24  Initiate Group Beta Strep (GBS) Prophylaxis Protocol, If Criteria Met  Continuous,   Status:  Canceled        Comments: NO TREATMENT RECOMMENDED IF: 1)  Maternal GBS status known negative 2)  Scheduled  birth with intact membranes, not in labor.  3 ) Maternal GBS unknown, no risk factors.   TREAT WITH ANTIBIOTICS IF:  1)   Maternal GBS status is known postive.  2)  Maternal GBS status unknown with these risk factors:  a)  Previous infant affected by GBS infection.  b)  GBS urinary tract infection (UTI) or bacteruria during pregnancy  c)  Unexplained maternal fever in labor (greater than or equal to 100.4F or 38.0C)  d)  Prolonged rupture of the membranes greater than or equal to 18 hours.  e)  Gestational age less than 37 weeks.    03/07/24 2233 03/07/24 2234  NPO Diet NPO Type: Ice Chips  Diet Effective Now,   Status:  Canceled         03/07/24 2233 03/07/24 2234  CBC (No Diff)  Once         03/07/24 2233 03/07/24 2234  T Pallidum Antibody w/ reflex RPR (Syphilis)  Once         03/07/24 2233 03/07/24 2234  Type & Screen  Once         03/07/24 2233 03/07/24 2234  Insert Peripheral IV  Once         03/07/24 2233 03/07/24 2234  Saline Lock & Maintain IV Access  Continuous,   Status:  Canceled         03/07/24 2233 03/07/24 2233  Position change  As Needed,   Status:  Canceled      Comments: For intra-uterine resuscitation for hypertonus, hypertstimulation, or non-reassuring fetal status    03/07/24 2233 03/07/24 2233  sodium chloride 0.9 % flush 10 mL  As Needed,   Status:  Discontinued         03/07/24 2233 03/07/24 2233  sodium chloride 0.9 % infusion 40 mL  As Needed,   Status:  Discontinued         03/07/24 2233 03/07/24 2233  lidocaine PF 1% (XYLOCAINE) injection 0.5 mL  Once As Needed,   Status:  Discontinued         03/07/24 2233 03/07/24 2233  lactated ringers bolus 1,000 mL  Once As Needed         03/07/24 2233 03/07/24 2233  acetaminophen (TYLENOL) tablet 650 mg  Every 4 Hours PRN,   Status:  Discontinued         03/07/24 2233    Unscheduled  Apply Ice to Perineum  As Needed      Comments: For 20 min q 2 hrs    03/08/24 1247    Unscheduled  Waffle Cushion  As Needed      Comments: For perineal discomfort    03/08/24 1247    Unscheduled  Kpad  As Needed      Comments: For pain    03/08/24  1247    Unscheduled  Warm compress  As Needed       24 1247    Unscheduled  Apply ace wrap, tight bra, or binder  As Needed       24 1247    Unscheduled  Apply ice packs  As Needed       24 1247    Signed and Held  Nurse may remove epidural catheter after delivery.  Until Discontinued         Signed and Held    Signed and Held  Transfer to postpartum when discharge criteria met.  Until Discontinued         Signed and Held                     Operative/Procedure Notes (last 24 hours)        Donte Brunner MD at [address] [place] [address]   Vaginal Delivery Note    Patient Name: Malena Stewart  : 1995  MRN: [id number]    Date of Delivery: 3/8/2024     Diagnosis     Pre & Post-Delivery:  Intrauterine pregnancy at 39w0d  Labor status: Induced Onset of Labor     Encounter for induction of labor    Prenatal care, subsequent pregnancy, third trimester             Problem List    Transfer to Postpartum     Review the Delivery Report for details.     Delivery     Delivery: Vaginal, Spontaneous     YOB: 2026    Time of Birth:  Gestational Age 10:01 AM   39w0d     Anesthesia: Epidural     Delivering clinician: Donte Brunner    Forceps?   No   Vacuum? No    Shoulder dystocia present: No        Delivery narrative: Patient pushed for 5 minutes and the vertex delivered vaginally over no episiotomy under epidural a male.  Baby cried on perineum but had a tight nuchal cord that had to be cut there.  Once baby delivered suction handed off to the nurses he was crying.  Cord blood obtained and placenta expressed intact.  EBL was 250 there were no lacerations and no stitches needed.        Infant     Findings: male  infant     Infant observations: Weight: 3710 g (8 lb 2.9 oz)   Length: 20.5  in  Observations/Comments:        Apgars:   @ 1 minute /      @ 5 minutes   Infant Name:      Placenta & Cord         Placenta delivered  Spontaneous  at   3/8/2024 10:05 AM     Cord: 3  "vessels  present.   Nuchal Cord?  yes; Number of nuchal loops present:  1    Cord blood obtained: Yes    Cord gases obtained:  No    Cord gas results: Venous:  No results found for: \"PHCVEN\", \"BECVEN\"    Arterial:  No results found for: \"PHCART\", \"BECART\"     Repair     Episiotomy: None     No    Lacerations: No   Estimated Blood Loss:       Quantitative Blood Loss:          Complications     hypertension    Disposition     Mother to Mother Baby/Postpartum  in stable condition currently.  Baby to NBN  in stable condition currently.    Donte Brunner MD  03/08/24  10:24 EST          Electronically signed by Donte Brunner MD at 03/08/24 1026          Physician Progress Notes (last 24 hours)        Donte Brunner MD at 03/08/24 0640          Cervix 4/80/-1.  Bag water ruptured clear.  IUPC placed.  Patient has epidural already.  ST category 1 plan to continue Pitocin    Electronically signed by Donte Brunner MD at 03/08/24 0640       "

## 2024-03-08 NOTE — ANESTHESIA PREPROCEDURE EVALUATION
Anesthesia Evaluation     Patient summary reviewed and Nursing notes reviewed                Airway   Mallampati: II  TM distance: >3 FB  Neck ROM: full  No difficulty expected  Dental - normal exam     Pulmonary - negative pulmonary ROS   Cardiovascular     (+) hypertension      Neuro/Psych  (+) headaches, psychiatric history Anxiety  GI/Hepatic/Renal/Endo    (+) GERD poorly controlled    Musculoskeletal (-) negative ROS    Abdominal    Substance History - negative use     OB/GYN    (+) Pregnant, pregnancy induced hypertension        Other - negative ROS                   Anesthesia Plan    ASA 2     epidural       Anesthetic plan, risks, benefits, and alternatives have been provided, discussed and informed consent has been obtained with: patient.    CODE STATUS:    Level Of Support Discussed With: Patient  Code Status (Patient has no pulse and is not breathing): CPR (Attempt to Resuscitate)  Medical Interventions (Patient has pulse or is breathing): Full Support

## 2024-03-08 NOTE — ANESTHESIA PROCEDURE NOTES
Labor Epidural      Patient reassessed immediately prior to procedure    Patient location during procedure: OB  Performed By  Anesthesiologist: Regina Garcia DO  Preanesthetic Checklist  Completed: patient identified, IV checked, risks and benefits discussed, surgical consent, monitors and equipment checked, pre-op evaluation and timeout performed  Additional Notes  CSE performed using 25g Guillermo  Prep:  Pt Position:sitting  Sterile Tech:cap, gloves, mask and sterile barrier  Prep:chlorhexidine gluconate and isopropyl alcohol  Monitoring:blood pressure monitoring  Epidural Block Procedure:  Approach:midline  Guidance:palpation technique  Location:L3-L4  Needle Type:Tuohy  Needle Gauge:17 G  Loss of Resistance Medium: air  Loss of Resistance: 5cm  Cath Depth at skin:12 cm  Paresthesia: none  Aspiration:negative  Test Dose:negative  Number of Attempts: 1  Post Assessment:  Dressing:occlusive dressing applied and secured with tape  Pt Tolerance:patient tolerated the procedure well with no apparent complications  Complications:no

## 2024-03-09 LAB
BASOPHILS # BLD AUTO: 0.04 10*3/MM3 (ref 0–0.2)
BASOPHILS NFR BLD AUTO: 0.4 % (ref 0–1.5)
DEPRECATED RDW RBC AUTO: 45.8 FL (ref 37–54)
EOSINOPHIL # BLD AUTO: 0.09 10*3/MM3 (ref 0–0.4)
EOSINOPHIL NFR BLD AUTO: 0.9 % (ref 0.3–6.2)
ERYTHROCYTE [DISTWIDTH] IN BLOOD BY AUTOMATED COUNT: 14.4 % (ref 12.3–15.4)
HCT VFR BLD AUTO: 30.4 % (ref 34–46.6)
HGB BLD-MCNC: 9.8 G/DL (ref 12–15.9)
IMM GRANULOCYTES # BLD AUTO: 0.07 10*3/MM3 (ref 0–0.05)
IMM GRANULOCYTES NFR BLD AUTO: 0.7 % (ref 0–0.5)
LYMPHOCYTES # BLD AUTO: 2.21 10*3/MM3 (ref 0.7–3.1)
LYMPHOCYTES NFR BLD AUTO: 22.6 % (ref 19.6–45.3)
MCH RBC QN AUTO: 28.4 PG (ref 26.6–33)
MCHC RBC AUTO-ENTMCNC: 32.2 G/DL (ref 31.5–35.7)
MCV RBC AUTO: 88.1 FL (ref 79–97)
MONOCYTES # BLD AUTO: 0.52 10*3/MM3 (ref 0.1–0.9)
MONOCYTES NFR BLD AUTO: 5.3 % (ref 5–12)
NEUTROPHILS NFR BLD AUTO: 6.87 10*3/MM3 (ref 1.7–7)
NEUTROPHILS NFR BLD AUTO: 70.1 % (ref 42.7–76)
NRBC BLD AUTO-RTO: 0 /100 WBC (ref 0–0.2)
PLATELET # BLD AUTO: 146 10*3/MM3 (ref 140–450)
PMV BLD AUTO: 12.6 FL (ref 6–12)
RBC # BLD AUTO: 3.45 10*6/MM3 (ref 3.77–5.28)
WBC NRBC COR # BLD AUTO: 9.8 10*3/MM3 (ref 3.4–10.8)

## 2024-03-09 PROCEDURE — 85025 COMPLETE CBC W/AUTO DIFF WBC: CPT | Performed by: OBSTETRICS & GYNECOLOGY

## 2024-03-09 RX ADMIN — IBUPROFEN 600 MG: 600 TABLET, FILM COATED ORAL at 00:19

## 2024-03-09 RX ADMIN — IBUPROFEN 600 MG: 600 TABLET, FILM COATED ORAL at 10:01

## 2024-03-09 RX ADMIN — LABETALOL HYDROCHLORIDE 50 MG: 100 TABLET, FILM COATED ORAL at 20:48

## 2024-03-09 RX ADMIN — DOCUSATE SODIUM 100 MG: 100 CAPSULE, LIQUID FILLED ORAL at 08:08

## 2024-03-09 RX ADMIN — IBUPROFEN 600 MG: 600 TABLET, FILM COATED ORAL at 20:48

## 2024-03-09 RX ADMIN — LABETALOL HYDROCHLORIDE 50 MG: 100 TABLET, FILM COATED ORAL at 08:08

## 2024-03-09 RX ADMIN — DOCUSATE SODIUM 100 MG: 100 CAPSULE, LIQUID FILLED ORAL at 20:48

## 2024-03-09 NOTE — PROGRESS NOTES
Postpartum Progress Note    Patient name: Malena Stewart  YOB: 1995   MRN: [id number]  Referring Provider: Donte Brunner MD  Admission Date: 3/7/2024  Date of Service: 3/9/2024    ID: 29 y.o.     Diagnosis:   S/p vaginal delivery     Prenatal care, subsequent pregnancy, third trimester    Encounter for induction of labor       Subjective:      No complaints.  Moderate lochia.  Ambulating, voiding, tolerating diet.  Pain well controlled.  The patient is not currently breastfeeding.   This baby is a male, desires circumcision.     Objective:      Vital signs:  Vital Signs Range for the last 24 hours  Temperature: Temp:  [98.2 °F (36.8 °C)-98.8 °F (37.1 °C)] 98.8 °F (37.1 °C)   Temp Source: Temp src: Oral   BP: BP: (128-133)/(77-85) 129/83   Pulse: Heart Rate:  [] 79   Respirations: Resp:  [16-18] 16   Weight: 93.4 kg (206 lb)     General: Alert & oriented x4, in no apparent distress  Abdomen: soft, nontender  Uterus: firm, nontender  Extremities: nontender; no edema      Labs:  Lab Results   Component Value Date    WBC 9.80 2024    HGB 9.8 (L) 2024    HCT 30.4 (L) 2024    MCV 88.1 2024     2024     Results from last 7 days   Lab Units 24   ABO TYPING  A   RH TYPING  Positive     External Prenatal Results       Pregnancy Outside Results - Transcribed From Office Records - See Scanned Records For Details       Test Value Date Time    ABO  A  24    Rh  Positive  24    Antibody Screen  Negative  24 2247       Negative  23 1241       Negative  23 1508    Varicella IgG       Rubella  2.08 index 23 1508    Hgb  9.8 g/dL 24 0604       11.5 g/dL 24       10.9 g/dL 24 0949       10.9 g/dL 24 0949       11.0 g/dL 23 1241       11.9 g/dL 11/28/23 1529       11.8 g/dL 10/09/23 1549       11.9 g/dL 23 1508       14.3 g/dL 23 0702    Hct  30.4 % 24 0604        35.0 % 03/07/24 2247       32.9 % 01/03/24 0949       32.9 % 01/03/24 0949       34.4 % 12/28/23 1241       35.0 % 11/28/23 1529       36.0 % 10/09/23 1549       36.2 % 07/28/23 1508       42.3 % 06/16/23 0702    Glucose Fasting GTT       Glucose Tolerance Test 1 hour       Glucose Tolerance Test 3 hour       Gonorrhea (discrete)  Negative  07/28/23 1508    Chlamydia (discrete)  Negative  07/28/23 1508    RPR  Non Reactive  07/28/23 1508    VDRL       Syphilis Antibody       HBsAg  Negative  07/28/23 1508    Herpes Simplex Virus PCR       Herpes Simplex VIrus Culture       HIV  Non Reactive  07/28/23 1508    Hep C RNA Quant PCR       Hep C Antibody  Non Reactive  07/28/23 1508    AFP       Group B Strep  No Group B Streptococcus Isolated at 2 days  02/15/24 1748    GBS Susceptibility to Clindamycin       GBS Susceptibility to Erythromycin       Fetal Fibronectin       Genetic Testing, Maternal Blood                 Drug Screening       Test Value Date Time    Urine Drug Screen       Amphetamine Screen  Negative ng/mL 07/28/23 1508    Barbiturate Screen  Negative ng/mL 07/28/23 1508    Benzodiazepine Screen  Negative ng/mL 07/28/23 1508    Methadone Screen  Negative ng/mL 07/28/23 1508    Phencyclidine Screen  Negative ng/mL 07/28/23 1508    Opiates Screen       THC Screen       Cocaine Screen       Propoxyphene Screen  Negative ng/mL 07/28/23 1508    Buprenorphine Screen       Methamphetamine Screen       Oxycodone Screen       Tricyclic Antidepressants Screen                 Legend    ^: Historical                            Assessment/Plan:      PPD#1 s/p vaginal delivery.  1. S/p vaginal delivery: Doing well.Continue routine postpartum care.    2. Infant feeding: Supportive care.  The patient is not currently breastfeeding.  3. Chronic hypertension: Stable blood pressures on labetalol. Denies preeclampsia symptoms.

## 2024-03-09 NOTE — PLAN OF CARE
Problem: Adult Inpatient Plan of Care  Goal: Plan of Care Review  Outcome: Ongoing, Progressing  Goal: Patient-Specific Goal (Individualized)  Outcome: Ongoing, Progressing  Goal: Absence of Hospital-Acquired Illness or Injury  Outcome: Ongoing, Progressing  Intervention: Identify and Manage Fall Risk  Recent Flowsheet Documentation  Taken 3/9/2024 0200 by Jacqueline Benjamin RN  Safety Promotion/Fall Prevention: safety round/check completed  Taken 3/9/2024 0000 by Jacqueline Benjamin RN  Safety Promotion/Fall Prevention: safety round/check completed  Taken 3/8/2024 2200 by Jacqueline Benjamin RN  Safety Promotion/Fall Prevention: safety round/check completed  Taken 3/8/2024 1945 by Jacqueline Benjamin RN  Safety Promotion/Fall Prevention:   clutter free environment maintained   assistive device/personal items within reach   fall prevention program maintained   nonskid shoes/slippers when out of bed   room organization consistent  Intervention: Prevent Skin Injury  Recent Flowsheet Documentation  Taken 3/8/2024 1945 by Jacqueline Benjamin RN  Body Position: position changed independently  Intervention: Prevent and Manage VTE (Venous Thromboembolism) Risk  Recent Flowsheet Documentation  Taken 3/9/2024 0048 by Jacqueline Benjamin RN  Activity Management: up ad nicholas  Taken 3/8/2024 1945 by Jacqueline Benjamin RN  Activity Management: up ad nicholas  Goal: Optimal Comfort and Wellbeing  Outcome: Ongoing, Progressing  Intervention: Provide Person-Centered Care  Recent Flowsheet Documentation  Taken 3/8/2024 1945 by Jacqueline Benjamin RN  Trust Relationship/Rapport:   care explained   choices provided   emotional support provided   questions answered   questions encouraged   reassurance provided  Goal: Readiness for Transition of Care  Outcome: Ongoing, Progressing     Problem: Adjustment to Role Transition (Postpartum Vaginal Delivery)  Goal: Successful Maternal Role Transition  Outcome: Ongoing, Progressing     Problem: Bleeding (Postpartum Vaginal  Delivery)  Goal: Hemostasis  Outcome: Ongoing, Progressing     Problem: Infection (Postpartum Vaginal Delivery)  Goal: Absence of Infection Signs/Symptoms  Outcome: Ongoing, Progressing     Problem: Pain (Postpartum Vaginal Delivery)  Goal: Acceptable Pain Control  Outcome: Ongoing, Progressing     Problem: Urinary Retention (Postpartum Vaginal Delivery)  Goal: Effective Urinary Elimination  Outcome: Ongoing, Progressing   Goal Outcome Evaluation:

## 2024-03-10 VITALS
SYSTOLIC BLOOD PRESSURE: 121 MMHG | RESPIRATION RATE: 16 BRPM | TEMPERATURE: 98.5 F | BODY MASS INDEX: 36.5 KG/M2 | WEIGHT: 206 LBS | HEIGHT: 63 IN | DIASTOLIC BLOOD PRESSURE: 77 MMHG | HEART RATE: 78 BPM

## 2024-03-10 PROBLEM — Z34.83 PRENATAL CARE, SUBSEQUENT PREGNANCY, THIRD TRIMESTER: Status: RESOLVED | Noted: 2024-03-08 | Resolved: 2024-03-10

## 2024-03-10 PROCEDURE — 0503F POSTPARTUM CARE VISIT: CPT

## 2024-03-10 RX ADMIN — DOCUSATE SODIUM 100 MG: 100 CAPSULE, LIQUID FILLED ORAL at 08:24

## 2024-03-10 RX ADMIN — LABETALOL HYDROCHLORIDE 50 MG: 100 TABLET, FILM COATED ORAL at 08:23

## 2024-03-10 NOTE — DISCHARGE SUMMARY
Discharge Summary    Date of Admission: 3/7/2024  Date of Discharge:  3/10/2024      Patient: Malena Stewart      MR#:[id number]    Delivery Provider: Donte Brunner     Attending Provider: Donte Brunner MD    Presenting Problem/History of Present Illness  Prenatal care, subsequent pregnancy, third trimester [Z34.83]       Chronic hypertension during pregnancy, antepartum    Maternal anemia in pregnancy, antepartum    Encounter for induction of labor         Discharge Diagnosis: Vaginal delivery at 39w0d    Procedures:  Vaginal, Spontaneous     3/8/2024    10:01 AM        Discharge Date: 3/10/2024;     Hospital Course  Patient is a 29 y.o. female  at 39w0d status post vaginal delivery without complication. Postpartum the patient did well. She remained afebrile, with vital signs stable. She was ready for discharge on postpartum day 2.     Infant:   male  fetus 3710 g (8 lb 2.9 oz)  with Apgar scores of 8 , 9  at five minutes.    Condition on Discharge:  Stable    Vital Signs  Temp:  [98.3 °F (36.8 °C)-98.6 °F (37 °C)] 98.5 °F (36.9 °C)  Heart Rate:  [73-81] 78  Resp:  [16] 16  BP: (121-148)/(77-86) 121/77    Lab Results   Component Value Date    WBC 9.80 2024    HGB 9.8 (L) 2024    HCT 30.4 (L) 2024    MCV 88.1 2024     2024       Discharge Disposition  Home or Self Care    Discharge Medications     Discharge Medications        Changes to Medications        Instructions Start Date   labetalol 100 MG tablet  Commonly known as: NORMODYNE  What changed:   how much to take  when to take this   50 mg, Oral, 2 Times Daily             Continue These Medications        Instructions Start Date   ferrous sulfate 325 (65 FE) MG tablet   325 mg, Oral, Daily With Breakfast      pantoprazole 20 MG EC tablet  Commonly known as: PROTONIX   20 mg, Oral, Daily      prenatal (CLASSIC) vitamin 28-0.8 MG tablet tablet  Generic drug: prenatal vitamin   Oral, Daily               Discharge  Diet:   Diet Instructions       Diet: Regular/House Diet; Regular (IDDSI 7); Thin (IDDSI 0)      Discharge Diet: Regular/House Diet    Texture: Regular (IDDSI 7)    Fluid Consistency: Thin (IDDSI 0)            Activity at Discharge:   Activity Instructions       Bathing Restrictions      Type of Restriction:  Bathing  Sex       Explain Sexual Activity Restrictions: 6 weeks    Bathing Restrictions: No Tub Bath            Follow-up Appointments  No future appointments.  Additional Instructions for the Follow-ups that You Need to Schedule       Call MD With Problems / Concerns   As directed      Instructions: Discussed changes in postpartum mood-anxiety, depression, sadness to call office for evaluation as soon as symptoms arise even if before  appointment. Monitor for excessive bleeding >1 pad/hr for several hours, dizziness, syncope, fever or changes in blood pressure.     Signs of labor reviewed such as contractions five minutes apart getting closer together and stronger, decreased fetal movement, vaginal bleeding, or leaking of fluid. Reviewed Pre-eclampsia signs/symptoms-Headache not relieved by tylenol or rest, chest pain, shortness of breath, right upper quadrant pain, blurry vision-go to labor and delivery if office is closed or call office if open, patient verbalized understanding.    Order Comments: Instructions: Discussed changes in postpartum mood-anxiety, depression, sadness to call office for evaluation as soon as symptoms arise even if before  appointment. Monitor for excessive bleeding >1 pad/hr for several hours, dizziness, syncope, fever or changes in blood pressure.  Signs of labor reviewed such as contractions five minutes apart getting closer together and stronger, decreased fetal movement, vaginal bleeding, or leaking of fluid. Reviewed Pre-eclampsia signs/symptoms-Headache not relieved by tylenol or rest, chest pain, shortness of breath, right upper quadrant pain, blurry vision-go to labor and  delivery if office is closed or call office if open, patient verbalized understanding.         Discharge Follow-up with Specified Provider: TOBIAS; 2 Days   As directed      To: APRN   Follow Up: 2 Days   Follow Up Details: BP check              CHTN-continue labetalol @ home.  BP check this week.    TOBIAS Chairez  03/10/24  11:34 EDT  Csd

## 2024-03-13 ENCOUNTER — TELEPHONE (OUTPATIENT)
Dept: OBSTETRICS AND GYNECOLOGY | Facility: CLINIC | Age: 29
End: 2024-03-13
Payer: COMMERCIAL

## 2024-03-14 ENCOUNTER — POSTPARTUM VISIT (OUTPATIENT)
Dept: OBSTETRICS AND GYNECOLOGY | Facility: CLINIC | Age: 29
End: 2024-03-14
Payer: COMMERCIAL

## 2024-03-14 VITALS
HEIGHT: 63 IN | BODY MASS INDEX: 33.66 KG/M2 | DIASTOLIC BLOOD PRESSURE: 92 MMHG | SYSTOLIC BLOOD PRESSURE: 156 MMHG | WEIGHT: 190 LBS

## 2024-03-14 DIAGNOSIS — Z01.30 BP CHECK: ICD-10-CM

## 2024-03-14 DIAGNOSIS — N64.59 BREAST ENGORGEMENT: ICD-10-CM

## 2024-03-14 DIAGNOSIS — Z30.09 COUNSELING FOR BIRTH CONTROL REGARDING INTRAUTERINE DEVICE (IUD): ICD-10-CM

## 2024-03-14 NOTE — PROGRESS NOTES
"      Chief Complaint   Patient presents with    Postpartum Care     Breast soreness       Postpartum problems visit       Malena Stewart is a 29 y.o.  who is s/p Vaginal, Spontaneous    Information for the patient's :  KRISTY Stewart [[id number]]   3/8/2024   male   KRISTY Stewart   3710 g (8 lb 2.9 oz)   Gestational Age: 39w0d    Baby Discharged: Discharged with Mom  Delivering Physician: Donte Brunner MD    She presents today for blood pressure check. and concerns with her breast. She has history of mastitis with previous delivery in (R) breast. She is not breastfeeding.    The patient complains of issues with her (L) breast including breast pain  and redness.and \"warm to the touch\"    Patient describes bleeding as moderate.  Patient is bottle feeding.  denies bowel or bladder issues.  She denies taking labetalol today. She denies headache, visual changes or epigastric pain.     Patient denies postpartum depression. Postpartum Depression Screening Questionnaire: score = 6, no treatment indicated.    The additional following portions of the patient's history were reviewed and updated as appropriate: allergies, current medications, past family history, past medical history, past social history, past surgical history, and problem list.      Review of Systems   Constitutional: Negative.    HENT: Negative.     Eyes: Negative.    Respiratory: Negative.     Cardiovascular: Negative.    Gastrointestinal: Negative.    Endocrine: Negative.    Genitourinary: Negative.  Positive for breast pain.   Musculoskeletal: Negative.    Skin: Negative.    Allergic/Immunologic: Negative.    Neurological: Negative.    Hematological: Negative.    Psychiatric/Behavioral: Negative.         I have reviewed and agree with the HPI, ROS, and historical information as entered above. Meghann Simpson, APRN      Objective   /92   Ht 160 cm (63\")   Wt 86.2 kg (190 lb)   LMP 2023   BMI 33.66 kg/m² "     Physical Exam  Vitals and nursing note reviewed. Exam conducted with a chaperone present.   Constitutional:       Appearance: Normal appearance.   Pulmonary:      Effort: No retractions.   Chest:      Chest wall: No mass.   Breasts:     Right: No mass, nipple discharge, skin change or tenderness.      Left: No mass, nipple discharge, skin change or tenderness.      Comments: Bilateral breast engorgement without mastitis  Neurological:      Mental Status: She is alert.          Assessment and Plan    Problem List Items Addressed This Visit    None  Visit Diagnoses       Postpartum follow-up    -  Primary    BP check        Breast engorgement        Counseling for birth control regarding intrauterine device (IUD)                S/p Vaginal delivery, 1 weeks postpartum.  Doing well.  Infant doing well.  Continue to monitor BP's and call for BP's staying over 150/100. Pt reports BP's have been in the 130's/80's and that she forgot to take her medication today. She plans to take her labetalol when she gets home.  Discussed managing breast engorgement while not breastfeeding.  Continued pelvic rest until 6 weeks PP.   Contraception: contraceptive methods: Abstinence but wants Mirena inserted at her 6 week PP appt.  Return in about 5 weeks (around 4/18/2024) for PP w/ Mirena insertion.    Meghann Simpson, APRN  03/14/2024

## 2024-03-18 ENCOUNTER — MATERNAL SCREENING (OUTPATIENT)
Dept: CALL CENTER | Facility: HOSPITAL | Age: 29
End: 2024-03-18
Payer: COMMERCIAL

## 2024-03-18 NOTE — OUTREACH NOTE
Maternal Screening Survey      Flowsheet Row Responses   Facility patient discharged from? [facility]   Attempt successful? No   Unsuccessful attempts Attempt 1  [left msg on vm]              KADI ROSARIO - Registered Nurse

## 2024-03-18 NOTE — OUTREACH NOTE
Maternal Screening Survey      Flowsheet Row Responses   Facility patient discharged from? [facility]   Attempt successful? No   Unsuccessful attempts Attempt 2              KADI ROSARIO - Registered Nurse

## 2024-03-19 ENCOUNTER — MATERNAL SCREENING (OUTPATIENT)
Dept: CALL CENTER | Facility: HOSPITAL | Age: 29
End: 2024-03-19
Payer: COMMERCIAL

## 2024-03-19 NOTE — OUTREACH NOTE
Maternal Screening Survey      Flowsheet Row Responses   Facility patient discharged from? [facility]   Attempt successful? No   Unsuccessful attempts Attempt 3   Revoke Decline to participate              Faraz VARNER - Registered Nurse

## 2024-04-18 ENCOUNTER — POSTPARTUM VISIT (OUTPATIENT)
Dept: OBSTETRICS AND GYNECOLOGY | Facility: CLINIC | Age: 29
End: 2024-04-18
Payer: COMMERCIAL

## 2024-04-18 VITALS
SYSTOLIC BLOOD PRESSURE: 152 MMHG | BODY MASS INDEX: 32.96 KG/M2 | DIASTOLIC BLOOD PRESSURE: 98 MMHG | HEIGHT: 63 IN | WEIGHT: 186 LBS

## 2024-04-18 DIAGNOSIS — Z30.430 ENCOUNTER FOR IUD INSERTION: ICD-10-CM

## 2024-04-18 DIAGNOSIS — O10.919 CHRONIC HYPERTENSION DURING PREGNANCY, ANTEPARTUM: ICD-10-CM

## 2024-04-18 DIAGNOSIS — Z01.419 WELL WOMAN EXAM WITH ROUTINE GYNECOLOGICAL EXAM: ICD-10-CM

## 2024-04-18 DIAGNOSIS — K42.9 UMBILICAL HERNIA WITHOUT OBSTRUCTION AND WITHOUT GANGRENE: ICD-10-CM

## 2024-04-18 PROBLEM — Z34.90 PREGNANCY: Status: RESOLVED | Noted: 2024-01-26 | Resolved: 2024-04-18

## 2024-04-18 LAB
B-HCG UR QL: NEGATIVE
EXPIRATION DATE: NORMAL
INTERNAL NEGATIVE CONTROL: NORMAL
INTERNAL POSITIVE CONTROL: NORMAL
Lab: NORMAL

## 2024-04-18 NOTE — PROGRESS NOTES
"     Gynecologic Procedure Note        Procedure: IUD Insertion     Procedures    Pre procedure indication 1) Desires Mirena  Post procedure indication 1) Desires Mirena    NDC: Mirena 24809-799-69  Lot #: [id number]  Exp Date: 01/31/2026  BH device    /98   Ht 160 cm (63\")   Wt 84.4 kg (186 lb)   LMP 04/16/2024 (Exact Date)   Breastfeeding No   BMI 32.95 kg/m²       The risks, benefits, and alternatives to Mirena were explained at length with the patient. All her questions were answered and consents were signed.  Her LMP was 04/16/2024 .  Urine Pregnancy Test was Negative.  Patient does not have an allergy to betadine or shellfish.    Time out: immediate members of the procedure team and patient agree to the following: correct patient, correct site, correct procedure to be performed. TOBIAS Byrne      The patient was placed in a dorsal lithotomy position on the examining table in [facility]. A bimanual exam confirmed the uterus was midposition. A speculum was inserted into the vagina and the cervix was brought into view.  The cervix was prepped with Betadine. The anterior lip of the cervix was then grasped with a single-tooth tenaculum. The endometrial cavity was then sounded to 7 centimeters. The sealed Mirena package was opened and the IUD was removed in a sterile fashion.    The upper edge of the depth setting the flange was set at the uterine sound measurement. The  was then carefully advanced to the cervical canal into the uterus to the level of the fundus.  The slider was then retracted about 1 cm and deployed the device. The device was then gently advanced to the fundus. The IUD was then released by pulling the slider down all the way. The  was removed carefully from the uterus. The threads were then cut leaving 2-3 cm visible outside of the cervix.  The single-tooth tenaculum was removed from the anterior lip. Good hemostasis was noted.   All other instruments were " removed from the vagina.       The patient tolerated the procedure very well with a mild amount of discomfort.  She was monitored for 10 minutes prior to discharge.      There were no complications.    The patient was counseled about the need to return in 4 weeks for IUD check.     She was counseled about the need to use a backup method of contraception such as condoms until her post insertion exam was performed. The patient verbalized understanding when the IUD will need to be removed/replaced. Written information was given to the patient.  The patient is counseled to contact us if she has any significant or increasing bleeding, pain, fever, chills, or other concerns. She is instructed to see a doctor right away if she believes that she may be pregnant at any time with the IUD in place.    Meghann Simpson, TOBIAS  04/18/2024

## 2024-04-18 NOTE — PROGRESS NOTES
Chief Complaint   Patient presents with    Contraception     Mirena insertion       Postpartum Visit         Malena Stewart is a 29 y.o.  who presents today for a 6 week(s) postpartum check.     C/S: no     Vaginal, Spontaneous    Information for the patient's :  KRISTY Stewart [[id number]]   3/8/2024   male   KRISTY Stewart   3710 g (8 lb 2.9 oz)   Gestational Age: 39w0d        Baby Discharged: Discharged with Mom  Delivering Physician: Donte Brunner MD    Her pregnancy was complicated by chronic HTN. The patient did not have a laceration or episiotomy  Patient describes vaginal bleeding as moderate.  Patient is bottle feeding.  She desires Mirena for contraception.      She would like to discuss the following complaints today: referral for umbilical hernia repair.    Patient reports concerns for postpartum anxiety. Patient denies  suicidal or homicidal ideation. Her postpartum depression screening questionnaire: score=7.  She reports that her primary manages her anxiety and she will see them.    Last Pap : 10/20/2022. Results: ASCUS. HPV: negative.   Last Completed Pap Smear            Ordered - PAP SMEAR (Every 3 Years) Ordered on 2024      10/20/2022  LIQUID-BASED PAP SMEAR, [facility] ([facility])                  The additional following portions of the patient's history were reviewed and updated as appropriate: allergies, current medications, past family history, past medical history, past social history, past surgical history, and problem list.    Review of Systems   Constitutional: Negative.    HENT: Negative.     Eyes: Negative.    Respiratory: Negative.     Cardiovascular: Negative.    Gastrointestinal: Negative.    Endocrine: Negative.    Genitourinary: Negative.    Musculoskeletal: Negative.    Skin: Negative.    Allergic/Immunologic: Negative.    Neurological: Negative.    Hematological: Negative.    Psychiatric/Behavioral:  The patient is nervous/anxious (PCP  "manages).      All other systems reviewed and are negative.     I have reviewed and agree with the HPI, ROS, and historical information as entered above. Meghann Simpson, APRN      /98   Ht 160 cm (63\")   Wt 84.4 kg (186 lb)   LMP 04/16/2024 (Exact Date)   Breastfeeding No   BMI 32.95 kg/m²     Physical Exam  Vitals and nursing note reviewed. Exam conducted with a chaperone present.   Constitutional:       Appearance: Normal appearance.   Genitourinary:     General: Normal vulva.      Exam position: Lithotomy position.      Labia:         Right: No rash, tenderness or lesion.         Left: No rash, tenderness or lesion.       Vagina: Normal. No lesions.      Cervix: Normal. No cervical motion tenderness, discharge, lesion or cervical bleeding.      Uterus: Normal. Not enlarged, not fixed and not tender.       Adnexa: Right adnexa normal and left adnexa normal.        Right: No mass or tenderness.          Left: No mass or tenderness.        Rectum: Normal. No external hemorrhoid.      Comments: Chaperone Present  Neurological:      Mental Status: She is alert.             Assessment and Plan    Problem List Items Addressed This Visit       Chronic hypertension during pregnancy, antepartum    Overview     On Labetalol 50mg daily only r/t hypotension    Baseline PEP ALT 48  Baby aspirin 81mg daily  NST 2xweek  PDC 2/5/24  Increased to 100mg QD.          Relevant Medications    labetalol (NORMODYNE) 100 MG tablet     Other Visit Diagnoses       Postpartum follow-up    -  Primary    Encounter for IUD insertion        Relevant Medications    Levonorgestrel (MIRENA) 20 MCG/DAY IUD intrauterine device 1 each (Start on 4/18/2024  1:15 PM)    Other Relevant Orders    POC Pregnancy, Urine (Completed)    US Non-ob Transvaginal    Well woman exam with routine gynecological exam        Relevant Orders    LIQUID-BASED PAP SMEAR WITH HPV GENOTYPING REGARDLESS OF INTERPRETATION ([facility])    Umbilical hernia " without obstruction and without gangrene        Relevant Orders    Ambulatory Referral to General Surgery (Completed)            S/p Vaginal delivery, 6 week(s) postpartum.  Doing well.    CHTN: BP elevated today. Pt to FU with her PCP for BP management. PCP also manages anxiety.  Referral to General Surgery for umbilical hernia evaluation.  Return to normal physical activity.  No pelvic restrictions.   Baby doing well.  Breastfeeding going well.  Bottlefeeding going well.  No si/sx of postpartum depression  Contraception: contraceptive methods: IUD.  Insertion date: 4/18/24  Return in about 4 weeks (around 5/16/2024) for Ultrasound.     Meghann Simpson, APRN  04/18/2024

## 2024-04-24 LAB — REF LAB TEST METHOD: NORMAL

## 2024-05-16 ENCOUNTER — OFFICE VISIT (OUTPATIENT)
Dept: OBSTETRICS AND GYNECOLOGY | Facility: CLINIC | Age: 29
End: 2024-05-16
Payer: COMMERCIAL

## 2024-05-16 VITALS — WEIGHT: 185.6 LBS | DIASTOLIC BLOOD PRESSURE: 90 MMHG | BODY MASS INDEX: 32.88 KG/M2 | SYSTOLIC BLOOD PRESSURE: 148 MMHG

## 2024-05-16 DIAGNOSIS — Z30.431 IUD CHECK UP: Primary | ICD-10-CM

## 2024-05-16 DIAGNOSIS — N93.9 ABNORMAL UTERINE BLEEDING (AUB): ICD-10-CM

## 2024-05-16 PROCEDURE — 99213 OFFICE O/P EST LOW 20 MIN: CPT | Performed by: OBSTETRICS & GYNECOLOGY

## 2024-05-16 PROCEDURE — 3077F SYST BP >= 140 MM HG: CPT | Performed by: OBSTETRICS & GYNECOLOGY

## 2024-05-16 PROCEDURE — 3080F DIAST BP >= 90 MM HG: CPT | Performed by: OBSTETRICS & GYNECOLOGY

## 2024-05-16 RX ORDER — PROGESTERONE 100 MG/1
100 CAPSULE ORAL DAILY
Qty: 30 CAPSULE | Refills: 2 | Status: SHIPPED | OUTPATIENT
Start: 2024-05-16

## 2024-05-16 RX ORDER — FLUOXETINE HYDROCHLORIDE 20 MG/1
20 CAPSULE ORAL DAILY
COMMUNITY
Start: 2024-05-06

## 2024-05-16 RX ORDER — METOPROLOL SUCCINATE 50 MG/1
50 TABLET, EXTENDED RELEASE ORAL DAILY
COMMUNITY
Start: 2024-05-06

## 2024-08-25 PROBLEM — J02.9 SORE THROAT: Status: ACTIVE | Noted: 2024-08-25

## 2024-08-26 ENCOUNTER — PATIENT ROUNDING (BHMG ONLY) (OUTPATIENT)
Dept: URGENT CARE | Facility: CLINIC | Age: 29
End: 2024-08-26
Payer: COMMERCIAL

## 2024-08-26 NOTE — ED NOTES
Thank you for letting us care for you in your recent visit to our urgent care center. We would love to hear about your experience with us. Was this the first time you have visited our location?    We’re always looking for ways to make our patients’ experiences even better. Do you have any recommendations on ways we may improve?     I appreciate you taking the time to respond. Please be on the lookout for a survey about your recent visit from [employer] via text or email. We would greatly appreciate if you could fill that out and turn it back in. We want your voice to be heard and we value your feedback.   Thank you for choosing [facility] for your healthcare needs.